# Patient Record
Sex: FEMALE | Employment: FULL TIME | URBAN - METROPOLITAN AREA
[De-identification: names, ages, dates, MRNs, and addresses within clinical notes are randomized per-mention and may not be internally consistent; named-entity substitution may affect disease eponyms.]

---

## 2017-04-24 ENCOUNTER — OFFICE VISIT (OUTPATIENT)
Dept: FAMILY MEDICINE CLINIC | Age: 62
End: 2017-04-24

## 2017-04-24 VITALS
HEART RATE: 65 BPM | TEMPERATURE: 97.8 F | WEIGHT: 141.4 LBS | DIASTOLIC BLOOD PRESSURE: 93 MMHG | HEIGHT: 62 IN | SYSTOLIC BLOOD PRESSURE: 135 MMHG | RESPIRATION RATE: 16 BRPM | OXYGEN SATURATION: 99 % | BODY MASS INDEX: 26.02 KG/M2

## 2017-04-24 DIAGNOSIS — E03.9 HYPOTHYROIDISM, UNSPECIFIED TYPE: ICD-10-CM

## 2017-04-24 DIAGNOSIS — Z11.59 NEED FOR HEPATITIS C SCREENING TEST: ICD-10-CM
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
## 2017-04-24 DIAGNOSIS — E03.9 ACQUIRED HYPOTHYROIDISM: ICD-10-CM

## 2017-04-24 DIAGNOSIS — Z00.00 WELL WOMAN EXAM WITHOUT GYNECOLOGICAL EXAM: Primary | ICD-10-CM

## 2017-04-24 RX ORDER — MUPIROCIN 20 MG/G
OINTMENT TOPICAL DAILY
Qty: 22 G | Refills: 0 | Status: SHIPPED | OUTPATIENT
Start: 2017-04-24 | End: 2019-06-27

## 2017-04-24 RX ORDER — LEVOTHYROXINE SODIUM 88 UG/1
88 TABLET ORAL
Qty: 90 TAB | Refills: 3 | Status: SHIPPED | OUTPATIENT
Start: 2017-04-24 | End: 2018-04-09 | Stop reason: SDUPTHER

## 2017-04-24 NOTE — PROGRESS NOTES
Chief Complaint   Patient presents with    Complete Physical     1. Have you been to the ER, urgent care clinic since your last visit? Hospitalized since your last visit? No    2. Have you seen or consulted any other health care providers outside of the [address] [place] [address] since your last visit? Include any pap smears or colon screening.  No

## 2017-04-24 NOTE — MR AVS SNAPSHOT
Visit Information Date & Time Provider Department Dept. Phone Encounter #  
 4/24/2017  9:00 AM Mouna Horne, [address] [facility] [phone] [id number] Follow-up Instructions Return if symptoms worsen or fail to improve. Upcoming Health Maintenance Date Due Hepatitis C Screening 1955 FOBT Q 1 YEAR AGE 50-75 5/21/2007 BREAST CANCER SCRN MAMMOGRAM 1/30/2019 PAP AKA CERVICAL CYTOLOGY 4/24/2020 DTaP/Tdap/Td series (2 - Td) 12/21/2022 Allergies as of 4/24/2017  Review Complete On: 4/24/2017 By: Mouna Horne MD  
  
 Severity Noted Reaction Type Reactions Amoxicillin Medium 11/16/2016    Other (comments) Augment gave her diarrhea bad Azithromycin  09/20/2010    Itching Fish Derived  09/20/2010    Swelling  
 angioedema Iodine  09/20/2010    Swelling Shellfish Derived  10/13/2010    Other (comments) Causes angioedema Current Immunizations  Reviewed on 10/27/2016 Name Date Hep A Vaccine 2/25/2013, 1/22/2013 Hep B Vaccine 2/25/2013, 1/22/2013 Influenza Vaccine 10/13/2016 Influenza Vaccine Whole 10/31/2012 Tdap 12/21/2012 Not reviewed this visit You Were Diagnosed With   
  
 Codes Comments Well woman exam without gynecological exam    -  Primary ICD-10-CM: Z00.00 ICD-9-CM: V70.0 Hypothyroidism, unspecified type     ICD-10-CM: E03.9 ICD-9-CM: 244.9 Acquired hypothyroidism     ICD-10-CM: E03.9 ICD-9-CM: 244.9 Need for hepatitis C screening test     ICD-10-CM: Z11.59 
ICD-9-CM: V73.89 Vitals BP Pulse Temp Resp Height(growth percentile) Weight(growth percentile) (!) 135/93 65 97.8 °F (36.6 °C) (Oral) 16 5' 2\" (1.575 m) 141 lb 6.4 oz (64.1 kg) LMP SpO2 BMI OB Status Smoking Status 09/20/2007 99% 25.86 kg/m2 Postmenopausal Never Smoker Vitals History BMI and BSA Data  Body Mass Index Body Surface Area  
 25.86 kg/m 2 1.67 m 2  
  
  
 Preferred Pharmacy Pharmacy Name Phone [id number] [facility] [phone] Your Updated Medication List  
  
   
This list is accurate as of: 4/24/17  9:28 AM.  Always use your most recent med list.  
  
  
  
  
 ALEVE 220 mg Cap Generic drug:  naproxen sodium Take  by mouth as needed. ascorbic acid (vitamin C) 500 mg tablet Commonly known as:  VITAMIN C Take  by mouth. B.infantis-B.ani-B.long-B.bifi 10-15 mg Tbec Take  by mouth.  
  
 biotin 2,500 mcg Tab Take  by mouth. cholecalciferol 1,000 unit Cap Commonly known as:  VITAMIN D3 Take  by mouth daily. Epinephrine Base 0.3 mg/0.3 mL Syrg  
0.3 mg by Injection route once as needed for 1 dose. FLAXSEED OIL PO Take  by mouth. hydrocortisone 1 % lotion Commonly known as:  ALA-JUAN RAMON Apply  to affected area two (2) times a day. use thin layer IMODIUM PO Take  by mouth.  
  
 levothyroxine 88 mcg tablet Commonly known as:  SYNTHROID Take 1 Tab by mouth Daily (before breakfast). SUMAtriptan 50 mg tablet Commonly known as:  IMITREX Prescriptions Sent to Pharmacy Refills  
 levothyroxine (SYNTHROID) 88 mcg tablet 3 Sig: Take 1 Tab by mouth Daily (before breakfast). Class: Normal  
 Pharmacy: [address] [place] [address] Ph #: [phone] Route: Oral  
  
We Performed the Following CBC W/O DIFF [23492 CPT(R)] CHOLESTEROL, TOTAL [31900 CPT(R)] HEPATITIS C AB [99707 CPT(R)] LDL, DIRECT [id number] CPT(R)] METABOLIC PANEL, COMPREHENSIVE [99060 CPT(R)] TSH 3RD GENERATION [62824 CPT(R)] Follow-up Instructions Return if symptoms worsen or fail to improve. Patient Instructions Learning About Low-Carbohydrate Diets for Weight Loss What is a low-carbohydrate diet? Low-carb diets avoid foods that are high in carbohydrate. These high-carb foods include pasta, bread, rice, cereal, fruits, and starchy vegetables. Instead, these diets usually have you eat foods that are high in fat and protein. Many people lose weight quickly on a low-carb diet. But the early weight loss is water. People on this diet often gain the weight back after they start eating carbs again. Not all diet plans are safe or work well. A lot of the evidence shows that low-carb diets aren't healthy. That's because these diets often don't include healthy foods like fruits and vegetables. Losing weight safely means balancing protein, fat, and carbs with every meal and snack. And low-carb diets don't always provide the vitamins, minerals, and fiber you need. If you have a serious medical condition, talk to your doctor before you try any diet. These conditions include kidney disease, heart disease, type 2 diabetes, high cholesterol, and high blood pressure. If you are pregnant, it may not be safe for your baby if you are on a low-carb diet. How can you lose weight safely? You might have heard that a diet plan helped another person lose weight. But that doesn't mean that it will work for you. It is very hard to stay on a diet that includes lots of big changes in your eating habits. If you want to get to a healthy weight and stay there, making healthy lifestyle changes will often work better than dieting. These steps can help. · Make a plan for change. Work with your doctor to create a plan that is right for you. · See a dietitian. He or she can show you how to make healthy changes in your eating habits. · Manage stress. If you have a lot of stress in your life, it can be hard to focus on making healthy changes to your daily habits. · Track your food and activity. You are likely to do better at losing weight if you keep track of what you eat and what you do. Follow-up care is a key part of your treatment and safety. Be sure to make and go to all appointments, and call your doctor if you are having problems. It's also a good idea to know your test results and keep a list of the medicines you take. Where can you learn more? Go to http://nisreen-chetna.info/. Enter A121 in the search box to learn more about \"Learning About Low-Carbohydrate Diets for Weight Loss. \" Current as of: December 8, 2016 Content Version: 11.2 © 3553-5101 [employer]. Care instructions adapted under license by [employer] (which disclaims liability or warranty for this information). If you have questions about a medical condition or this instruction, always ask your healthcare professional. [address] any warranty or liability for your use of this information. Learning About Dietary Guidelines What are the Dietary Guidelines for Americans? Dietary Guidelines for Americans provide tips for eating well and staying healthy. This helps reduce the risk for long-term (chronic) diseases. These adult guidelines from the [place] recommend that you: 
· Eat lots of fruits, vegetables, whole grains, and low-fat or nonfat dairy products. · Try to balance your eating with your activity. This helps you stay at a healthy weight. · Drink alcohol in moderation, if at all. · Limit foods high in salt, saturated fat, trans fat, and added sugar. What is MyPlate? MyPlate is the U.[place]. government's food guide. It can help you make your own well-balanced eating plan. A balanced eating plan means that you eat enough, but not too much, and that your food gives you the nutrients you need to stay healthy. MyPlate focuses on eating plenty of whole grains, fruits, and vegetables, and on limiting fat and sugar. It is available online at www. ChooseMyPlate.gov. How can you get started? MyPlate suggests that most adults eat certain amounts from the different food groups: 
Grains Eat 5 to 8 ounces of grains each day. Half of those should be whole grains. Choose whole-grain breads, cold and cooked cereals and grains, pasta (without creamy sauces), hard rolls, or low-fat or fat-free crackers. Vegetables Eat 2 to 3 cups of vegetables every day. They contain little if any fat. And they have lots of nutrients that help protect against heart disease. Fruits Eat 1½ to 2 cups of fruits every day. Fruits contain very little fat but lots of nutrients. Protein foods Most adults need 5 to 6½ ounces each day. Choose fish and lean poultry more often. Eat red meat and fried meats less often. Dried beans, tofu, and nuts are also good sources of protein. Dairy Most adults need 3 cups of milk and milk products a day. Choose low-fat or fat-free products from this food group. If you have problems digesting milk, try eating cheese or yogurt instead. Limit fats and oils, including those used in cooking. When you do use fats, choose oils that are liquid at room temperature (unsaturated fats). These include canola oil and olive oil. Avoid foods with trans fats, such as many fried foods, cookies, and snack foods. Where can you learn more? Go to http://nisreen-chetna.info/. Enter D194 in the search box to learn more about \"Learning About Dietary Guidelines. \" Current as of: July 26, 2016 Content Version: 11.2 © 5899-2004 [employer], [employer]. Care instructions adapted under license by [employer] (which disclaims liability or warranty for this information). If you have questions about a medical condition or this instruction, always ask your healthcare professional. [place] [address] any warranty or liability for your use of this information. Eating Healthy Foods: Care Instructions Your Care Instructions Eating healthy foods can help lower your risk for disease. Healthy food gives you energy and keeps your heart strong, your brain active, your muscles working, and your bones strong. A healthy diet includes a variety of foods from the basic food groups: grains, vegetables, fruits, milk and milk products, and meat and beans. Some people may eat more of their favorite foods from only one food group and, as a result, miss getting the nutrients they need. So, it is important to pay attention not only to what you eat but also to what you are missing from your diet. You can eat a healthy, balanced diet by making a few small changes. Follow-up care is a key part of your treatment and safety. Be sure to make and go to all appointments, and call your doctor if you are having problems. Its also a good idea to know your test results and keep a list of the medicines you take. How can you care for yourself at home? Look at what you eat · Keep a food diary for a week or two and record everything you eat or drink. Track the number of servings you eat from each food group. · For a balanced diet every day, eat a variety of: ¨ 6 or more ounce-equivalents of grains, such as cereals, breads, crackers, rice, or pasta, every day. An ounce-equivalent is 1 slice of bread, 1 cup of ready-to-eat cereal, or ½ cup of cooked rice, cooked pasta, or cooked cereal. 
¨ 2½ cups of vegetables, especially: § Dark-green vegetables such as broccoli and spinach. § Orange vegetables such as carrots and sweet potatoes. § Dry beans (such as guardado and kidney beans) and peas (such as lentils). ¨ 2 cups of fresh, frozen, or canned fruit. A small apple or 1 banana or orange equals 1 cup. ¨ 3 cups of nonfat or low-fat milk, yogurt, or other milk products. ¨ 5½ ounces of meat and beans, such as chicken, fish, lean meat, beans, nuts, and seeds.  One egg, 1 tablespoon of peanut butter, ½ ounce nuts or seeds, or ¼ cup of cooked beans equals 1 ounce of meat. · Learn how to read food labels for serving sizes and ingredients. Fast-food and convenience-food meals often contain few or no fruits or vegetables. Make sure you eat some fruits and vegetables to make the meal more nutritious. · Look at your food diary. For each food group, add up what you have eaten and then divide the total by the number of days. This will give you an idea of how much you are eating from each food group. See if you can find some ways to change your diet to make it more healthy. Start small · Do not try to make dramatic changes to your diet all at once. You might feel that you are missing out on your favorite foods and then be more likely to fail. · Start slowly, and gradually change your habits. Try some of the following: ¨ Use whole wheat bread instead of white bread. ¨ Use nonfat or low-fat milk instead of whole milk. ¨ Eat brown rice instead of white rice, and eat whole wheat pasta instead of white-flour pasta. ¨ Try low-fat cheeses and low-fat yogurt. ¨ Add more fruits and vegetables to meals and have them for snacks. ¨ Add lettuce, tomato, cucumber, and onion to sandwiches. ¨ Add fruit to yogurt and cereal. 
Enjoy food · You can still eat your favorite foods. You just may need to eat less of them. If your favorite foods are high in fat, salt, and sugar, limit how often you eat them, but do not cut them out entirely. · Eat a wide variety of foods. Make healthy choices when eating out · The type of restaurant you choose can help you make healthy choices. Even fast-food chains are now offering more low-fat or healthier choices on the menu. · Choose smaller portions, or take half of your meal home. · When eating out, try: ¨ A veggie pizza with a whole wheat crust or grilled chicken (instead of sausage or pepperoni). ¨ Pasta with roasted vegetables, grilled chicken, or marinara sauce instead of cream sauce. ¨ A vegetable wrap or grilled chicken wrap. ¨ Broiled or poached food instead of fried or breaded items. Make healthy choices easy · Buy packaged, prewashed, ready-to-eat fresh vegetables and fruits, such as baby carrots, salad mixes, and chopped or shredded broccoli and cauliflower. · Buy packaged, presliced fruits, such as melon or pineapple. · Choose 100% fruit or vegetable juice instead of soda. Limit juice intake to 4 to 6 oz (½ to ¾ cup) a day. · Blend low-fat yogurt, fruit juice, and canned or frozen fruit to make a smoothie for breakfast or a snack. Where can you learn more? Go to http://nisreen-chetna.info/. Enter T756 in the search box to learn more about \"Eating Healthy Foods: Care Instructions. \" Current as of: November 20, 2015 Content Version: 11.2 © 2867-3491 [employer]. Care instructions adapted under license by [employer] (which disclaims liability or warranty for this information). If you have questions about a medical condition or this instruction, always ask your healthcare professional. [place] [address] any warranty or liability for your use of this information. Hepatitis C Virus Tests: About These Tests What are they? Hepatitis C virus tests are blood tests that check for substances in the blood that show whether you have hepatitis C now or had it in the past. The tests can also tell you what type of hepatitis C you have and how severe the disease is. Why are these tests done? You may need these tests if: 
· You have symptoms of hepatitis. · You may have been exposed to the virus. You are more likely to have been exposed to the virus if you inject drugs or are exposed to body fluids (such as if you are a health care worker). · You have had other tests that show you have liver problems. · You were born between [facility]. People in this age group are more likely to have hepatitis C and not know it. · You have HIV infection. The tests also are done to help your doctor decide about your treatment and see how well it works. How can you prepare for these tests? You do not need to do anything before you have these tests. What happens during these tests? A health professional takes a sample of your blood. What else should you know about these tests? · The tests can tell your doctor what type of hepatitis C you have and how severe it is. This can help your doctor determine treatment and see how effective it is. · If you have the tests soon after you were infected with hepatitis C, they may show that you do not have the disease even when you have it. This is because the substances that show you have hepatitis C can take weeks or months to develop, so they may not be in your blood yet. Your doctor may want you to be tested again. · If the tests show you have long-term hepatitis C, you need to take steps to prevent spreading the disease. What happens after these tests? · You will probably be able to go home right away. · You can go back to your usual activities right away. When should you call for help? Watch closely for changes in your health, and be sure to contact your doctor if you have any problems. Follow-up care is a key part of your treatment and safety. Be sure to make and go to all appointments, and call your doctor if you are having problems. It's also a good idea to keep a list of the medicines you take. Ask your doctor when you can expect to have your test results. Where can you learn more? Go to http://nisreen-chetna.info/. Enter A212 in the search box to learn more about \"Hepatitis C Virus Tests: About These Tests. \" Current as of: May 24, 2016 Content Version: 11.2 © 2488-3305 [employer].  Care instructions adapted under license by [employer] (which disclaims liability or warranty for this information). If you have questions about a medical condition or this instruction, always ask your healthcare professional. [place] [address] any warranty or liability for your use of this information. Well Visit, Women 48 to 72: Care Instructions Your Care Instructions Physical exams can help you stay healthy. Your doctor has checked your overall health and may have suggested ways to take good care of yourself. He or she also may have recommended tests. At home, you can help prevent illness with healthy eating, regular exercise, and other steps. Follow-up care is a key part of your treatment and safety. Be sure to make and go to all appointments, and call your doctor if you are having problems. It's also a good idea to know your test results and keep a list of the medicines you take. How can you care for yourself at home? · Reach and stay at a healthy weight. This will lower your risk for many problems, such as obesity, diabetes, heart disease, and high blood pressure. · Get at least 30 minutes of exercise on most days of the week. Walking is a good choice. You also may want to do other activities, such as running, swimming, cycling, or playing tennis or team sports. · Do not smoke. Smoking can make health problems worse. If you need help quitting, talk to your doctor about stop-smoking programs and medicines. These can increase your chances of quitting for good. · Protect your skin from too much sun. When you're outdoors from 10 a.m. to 4 p.m., stay in the shade or cover up with clothing and a hat with a wide brim. Wear sunglasses that block UV rays. Even when it's cloudy, put broad-spectrum sunscreen (SPF 30 or higher) on any exposed skin. · See a dentist one or two times a year for checkups and to have your teeth cleaned. · Wear a seat belt in the car. · Limit alcohol to 1 drink a day. Too much alcohol can cause health problems. Follow your doctor's advice about when to have certain tests. These tests can spot problems early. · Cholesterol. Your doctor will tell you how often to have this done based on your age, family history, or other things that can increase your risk for heart attack and stroke. · Blood pressure. Have your blood pressure checked during a routine doctor visit. Your doctor will tell you how often to check your blood pressure based on your age, your blood pressure results, and other factors. · Mammogram. Ask your doctor how often you should have a mammogram, which is an X-ray of your breasts. A mammogram can spot breast cancer before it can be felt and when it is easiest to treat. · Pap test and pelvic exam. Ask your doctor how often you should have a Pap test. You may not need to have a Pap test as often as you used to. · Vision. Have your eyes checked every year or two or as often as your doctor suggests. Some experts recommend that you have yearly exams for glaucoma and other age-related eye problems starting at age 48. · Hearing. Tell your doctor if you notice any change in your hearing. You can have tests to find out how well you hear. · Diabetes. Ask your doctor whether you should have tests for diabetes. · Colon cancer. You should begin tests for colon cancer at age 48. You may have one of several tests. Your doctor will tell you how often to have tests based on your age and risk. Risks include whether you already had a precancerous polyp removed from your colon or whether your parents, sisters and brothers, or children have had colon cancer. · Thyroid disease. Talk to your doctor about whether to have your thyroid checked as part of a regular physical exam. Women have an increased chance of a thyroid problem. · Osteoporosis. You should begin tests for bone density at age 72.  If you are younger than 72, ask your doctor whether you have factors that may increase your risk for this disease. You may want to have this test before age 72. · Heart attack and stroke risk. At least every 4 to 6 years, you should have your risk for heart attack and stroke assessed. Your doctor uses factors such as your age, blood pressure, cholesterol, and whether you smoke or have diabetes to show what your risk for a heart attack or stroke is over the next 10 years. When should you call for help? Watch closely for changes in your health, and be sure to contact your doctor if you have any problems or symptoms that concern you. Where can you learn more? Go to http://nisreen-chetna.info/. Enter Y563 in the search box to learn more about \"Well Visit, Women 50 to 72: Care Instructions. \" Current as of: July 19, 2016 Content Version: 11.2 © 4476-7626 [employer]. Care instructions adapted under license by my6sense (which disclaims liability or warranty for this information). If you have questions about a medical condition or this instruction, always ask your healthcare professional. [place] [address] any warranty or liability for your use of this information. Introducing [facility]! Dear Radha Aguilar: Thank you for requesting a All Web Leads account. Our records indicate that you already have an active All Web Leads account. You can access your account anytime at [email or web address] Did you know that you can access your hospital and ER discharge instructions at any time in [employer]s? You can also review all of your test results from your hospital stay or ER visit. Additional Information If you have questions, please visit the Frequently Asked Questions section of the All Web Leads website at [email or web address] Remember, All Web Leads is NOT to be used for urgent needs. For medical emergencies, dial 911. Now available from your iPhone and Android! Please provide this summary of care documentation to your next provider. Your primary care clinician is listed as Alen Boogie. If you have any questions after today's visit, please call [phone].

## 2017-04-24 NOTE — PROGRESS NOTES
Drea Kraft  63 y.o. female  1955  [address]  [id number]   [address]: Progress Note  Doris Arellano MD       Encounter Date: 4/24/2017    Chief Complaint   Patient presents with    Complete Physical     History of Present Illness   Drea Kraft is a 63 y.o. female who presents to clinic today for well visit. Only PMH is hypothyroidism. Doing well. Denies any dry hair, dry skin, constipation, weight gain. She exercises regularly  She watches her diet. She was attempting to lose weight as well. Denies any acute complaints. Pap UTD  Mammogram UTD  Colonoscopy done and not due for another 2 yrs. Currently menopausal for 10 yrs. No vaginal bleeding or spotting. Review of Systems   Review of Systems   Constitutional: Negative. All other systems reviewed and are negative. Vitals/Objective:     Vitals:    04/24/17 0904   BP: (!) 135/93   Pulse: 65   Resp: 16   Temp: 97.8 °F (36.6 °C)   TempSrc: Oral   SpO2: 99%   Weight: 141 lb 6.4 oz (64.1 kg)   Height: 5' 2\" (1.575 m)     Body mass index is 25.86 kg/(m^2). Physical Exam   Constitutional: She appears well-developed and well-nourished. No distress. HENT:   Head: Normocephalic and atraumatic. Right Ear: External ear normal.   Left Ear: External ear normal.   Mouth/Throat: Oropharynx is clear and moist.   Eyes: Pupils are equal, round, and reactive to light. Neck: Normal range of motion. Neck supple. No thyromegaly present. Cardiovascular: Normal rate, regular rhythm and normal heart sounds. Exam reveals no gallop and no friction rub. No murmur heard. Pulmonary/Chest: Effort normal and breath sounds normal. No respiratory distress. She has no wheezes. She has no rales. Abdominal: Soft. Bowel sounds are normal. There is no tenderness. Lymphadenopathy:     She has no cervical adenopathy. Skin: Rash noted. She is not diaphoretic.    Small erythematous mm size bite with some superficial scaling present. Non tender without active discharge   Vitals reviewed. Lab Results   Component Value Date/Time    TSH 3.410 06/13/2016 02:05 PM    TSH 2.560 01/15/2016 04:45 PM         No results found for this or any previous visit (from the past 24 hour(s)). Assessment and Plan:   1. Well woman exam without gynecological exam  Non fasting labs. - CBC W/O DIFF  - TSH 3RD GENERATION  - METABOLIC PANEL, COMPREHENSIVE  - CHOLESTEROL, TOTAL  - LDL, DIRECT  - HEPATITIS C AB    2. Hypothyroidism, unspecified type    - TSH 3RD GENERATION    3. Acquired hypothyroidism    - TSH 3RD GENERATION  - levothyroxine (SYNTHROID) 88 mcg tablet; Take 1 Tab by mouth Daily (before breakfast). Dispense: 90 Tab; Refill: 3    4. Need for hepatitis C screening test    - HEPATITIS C AB    5. Bug bite with infection, initial encounter    - mupirocin (BACTROBAN) 2 % ointment; Apply  to affected area daily. Dispense: 22 g; Refill: 0    I have discussed the diagnosis with the patient and the intended plan as seen in the above orders. she has expressed understanding. The patient has received an after-visit summary and questions were answered concerning future plans. I have discussed medication side effects and warnings with the patient as well. Follow-up Disposition:  Return if symptoms worsen or fail to improve. Electronically Signed: Otho Meigs, MD     History   Patients past medical, surgical and family histories were reviewed and updated. Past Medical History:   Diagnosis Date    Thyroid disease     hypothyroid     Past Surgical History:   Procedure Laterality Date    HX APPENDECTOMY      HX BUNIONECTOMY      HX GYN      BTL    NE EMBOLIZATION UTERINE FIBROID       History reviewed. No pertinent family history.   Social History     Social History    Marital status:      Spouse name: N/A    Number of children: N/A    Years of education: N/A     Occupational History  Not on file. Social History Main Topics    Smoking status: Never Smoker    Smokeless tobacco: Never Used    Alcohol use No    Drug use: No    Sexual activity: Yes     Partners: Male     Birth control/ protection: None     Other Topics Concern    Not on file     Social History Narrative            Current Medications/Allergies     Current Outpatient Prescriptions   Medication Sig Dispense Refill    levothyroxine (SYNTHROID) 88 mcg tablet Take 1 Tab by mouth Daily (before breakfast). 90 Tab 3    mupirocin (BACTROBAN) 2 % ointment Apply  to affected area daily. 22 g 0    LOPERAMIDE HCL (IMODIUM PO) Take  by mouth.  ascorbic acid (VITAMIN C) 500 mg tablet Take  by mouth.  cholecalciferol (VITAMIN D3) 1,000 unit cap Take  by mouth daily.  biotin 2,500 mcg tab Take  by mouth.  B.infantis-B.ani-B.long-B.bifi 10-15 mg TbEC Take  by mouth.  hydrocortisone (ALA-JUAN RAMON) 1 % lotion Apply  to affected area two (2) times a day. use thin layer 60 mL 0    SUMAtriptan (IMITREX) 50 mg tablet   6    FLAXSEED OIL PO Take  by mouth.  naproxen sodium (ALEVE) 220 mg cap Take  by mouth as needed.  Epinephrine Base 0.3 mg/0.3 mL (1:1,000) Syrg 0.3 mg by Injection route once as needed for 1 dose.  1 Syringe 1     Allergies   Allergen Reactions    Amoxicillin Other (comments)     Augment gave her diarrhea bad    Azithromycin Itching    Fish Derived Swelling     angioedema    Iodine Swelling    Shellfish Derived Other (comments)     Causes angioedema

## 2017-04-24 NOTE — PATIENT INSTRUCTIONS
Learning About Low-Carbohydrate Diets for Weight Loss  What is a low-carbohydrate diet? Low-carb diets avoid foods that are high in carbohydrate. These high-carb foods include pasta, bread, rice, cereal, fruits, and starchy vegetables. Instead, these diets usually have you eat foods that are high in fat and protein. Many people lose weight quickly on a low-carb diet. But the early weight loss is water. People on this diet often gain the weight back after they start eating carbs again. Not all diet plans are safe or work well. A lot of the evidence shows that low-carb diets aren't healthy. That's because these diets often don't include healthy foods like fruits and vegetables. Losing weight safely means balancing protein, fat, and carbs with every meal and snack. And low-carb diets don't always provide the vitamins, minerals, and fiber you need. If you have a serious medical condition, talk to your doctor before you try any diet. These conditions include kidney disease, heart disease, type 2 diabetes, high cholesterol, and high blood pressure. If you are pregnant, it may not be safe for your baby if you are on a low-carb diet. How can you lose weight safely? You might have heard that a diet plan helped another person lose weight. But that doesn't mean that it will work for you. It is very hard to stay on a diet that includes lots of big changes in your eating habits. If you want to get to a healthy weight and stay there, making healthy lifestyle changes will often work better than dieting. These steps can help. · Make a plan for change. Work with your doctor to create a plan that is right for you. · See a dietitian. He or she can show you how to make healthy changes in your eating habits. · Manage stress. If you have a lot of stress in your life, it can be hard to focus on making healthy changes to your daily habits. · Track your food and activity.  You are likely to do better at losing weight if you keep track of what you eat and what you do. Follow-up care is a key part of your treatment and safety. Be sure to make and go to all appointments, and call your doctor if you are having problems. It's also a good idea to know your test results and keep a list of the medicines you take. Where can you learn more? Go to http://nisreen-chetna.info/. Enter A121 in the search box to learn more about \"Learning About Low-Carbohydrate Diets for Weight Loss. \"  Current as of: December 8, 2016  Content Version: 11.2  © 5354-3272 [employer]. Care instructions adapted under license by [employer] (which disclaims liability or warranty for this information). If you have questions about a medical condition or this instruction, always ask your healthcare professional. [address] any warranty or liability for your use of this information. Learning About Dietary Guidelines  What are the Dietary Guidelines for Americans? Dietary Guidelines for Americans provide tips for eating well and staying healthy. This helps reduce the risk for long-term (chronic) diseases. These adult guidelines from the [place] recommend that you:  · Eat lots of fruits, vegetables, whole grains, and low-fat or nonfat dairy products. · Try to balance your eating with your activity. This helps you stay at a healthy weight. · Drink alcohol in moderation, if at all. · Limit foods high in salt, saturated fat, trans fat, and added sugar. What is MyPlate? MyPlate is the [place] government's food guide. It can help you make your own well-balanced eating plan. A balanced eating plan means that you eat enough, but not too much, and that your food gives you the nutrients you need to stay healthy. MyPlate focuses on eating plenty of whole grains, fruits, and vegetables, and on limiting fat and sugar. It is available online at www. ChooseMyPlate.gov.   How can you get started? MyPlate suggests that most adults eat certain amounts from the different food groups:  Grains  Eat 5 to 8 ounces of grains each day. Half of those should be whole grains. Choose whole-grain breads, cold and cooked cereals and grains, pasta (without creamy sauces), hard rolls, or low-fat or fat-free crackers. Vegetables  Eat 2 to 3 cups of vegetables every day. They contain little if any fat. And they have lots of nutrients that help protect against heart disease. Fruits  Eat 1½ to 2 cups of fruits every day. Fruits contain very little fat but lots of nutrients. Protein foods  Most adults need 5 to 6½ ounces each day. Choose fish and lean poultry more often. Eat red meat and fried meats less often. Dried beans, tofu, and nuts are also good sources of protein. Dairy  Most adults need 3 cups of milk and milk products a day. Choose low-fat or fat-free products from this food group. If you have problems digesting milk, try eating cheese or yogurt instead. Limit fats and oils, including those used in cooking. When you do use fats, choose oils that are liquid at room temperature (unsaturated fats). These include canola oil and olive oil. Avoid foods with trans fats, such as many fried foods, cookies, and snack foods. Where can you learn more? Go to http://nisreen-chetna.info/. Enter J143 in the search box to learn more about \"Learning About Dietary Guidelines. \"  Current as of: July 26, 2016  Content Version: 11.2  © 6129-0550 [employer]wolf. Care instructions adapted under license by [employer] (which disclaims liability or warranty for this information). If you have questions about a medical condition or this instruction, always ask your healthcare professional. [place] [address] any warranty or liability for your use of this information.        Eating Healthy Foods: Care Instructions  Your Care Instructions  Eating healthy foods can help lower your risk for disease. Healthy food gives you energy and keeps your heart strong, your brain active, your muscles working, and your bones strong. A healthy diet includes a variety of foods from the basic food groups: grains, vegetables, fruits, milk and milk products, and meat and beans. Some people may eat more of their favorite foods from only one food group and, as a result, miss getting the nutrients they need. So, it is important to pay attention not only to what you eat but also to what you are missing from your diet. You can eat a healthy, balanced diet by making a few small changes. Follow-up care is a key part of your treatment and safety. Be sure to make and go to all appointments, and call your doctor if you are having problems. Its also a good idea to know your test results and keep a list of the medicines you take. How can you care for yourself at home? Look at what you eat  · Keep a food diary for a week or two and record everything you eat or drink. Track the number of servings you eat from each food group. · For a balanced diet every day, eat a variety of:  ¨ 6 or more ounce-equivalents of grains, such as cereals, breads, crackers, rice, or pasta, every day. An ounce-equivalent is 1 slice of bread, 1 cup of ready-to-eat cereal, or ½ cup of cooked rice, cooked pasta, or cooked cereal.  ¨ 2½ cups of vegetables, especially:  § Dark-green vegetables such as broccoli and spinach. § Orange vegetables such as carrots and sweet potatoes. § Dry beans (such as guardado and kidney beans) and peas (such as lentils). ¨ 2 cups of fresh, frozen, or canned fruit. A small apple or 1 banana or orange equals 1 cup. ¨ 3 cups of nonfat or low-fat milk, yogurt, or other milk products. ¨ 5½ ounces of meat and beans, such as chicken, fish, lean meat, beans, nuts, and seeds. One egg, 1 tablespoon of peanut butter, ½ ounce nuts or seeds, or ¼ cup of cooked beans equals 1 ounce of meat.   · Learn how to read food labels for serving sizes and ingredients. Fast-food and convenience-food meals often contain few or no fruits or vegetables. Make sure you eat some fruits and vegetables to make the meal more nutritious. · Look at your food diary. For each food group, add up what you have eaten and then divide the total by the number of days. This will give you an idea of how much you are eating from each food group. See if you can find some ways to change your diet to make it more healthy. Start small  · Do not try to make dramatic changes to your diet all at once. You might feel that you are missing out on your favorite foods and then be more likely to fail. · Start slowly, and gradually change your habits. Try some of the following:  ¨ Use whole wheat bread instead of white bread. ¨ Use nonfat or low-fat milk instead of whole milk. ¨ Eat brown rice instead of white rice, and eat whole wheat pasta instead of white-flour pasta. ¨ Try low-fat cheeses and low-fat yogurt. ¨ Add more fruits and vegetables to meals and have them for snacks. ¨ Add lettuce, tomato, cucumber, and onion to sandwiches. ¨ Add fruit to yogurt and cereal.  Enjoy food  · You can still eat your favorite foods. You just may need to eat less of them. If your favorite foods are high in fat, salt, and sugar, limit how often you eat them, but do not cut them out entirely. · Eat a wide variety of foods. Make healthy choices when eating out  · The type of restaurant you choose can help you make healthy choices. Even fast-food chains are now offering more low-fat or healthier choices on the menu. · Choose smaller portions, or take half of your meal home. · When eating out, try:  ¨ A veggie pizza with a whole wheat crust or grilled chicken (instead of sausage or pepperoni). ¨ Pasta with roasted vegetables, grilled chicken, or marinara sauce instead of cream sauce. ¨ A vegetable wrap or grilled chicken wrap.   ¨ Broiled or poached food instead of fried or breaded items. Make healthy choices easy  · Buy packaged, prewashed, ready-to-eat fresh vegetables and fruits, such as baby carrots, salad mixes, and chopped or shredded broccoli and cauliflower. · Buy packaged, presliced fruits, such as melon or pineapple. · Choose 100% fruit or vegetable juice instead of soda. Limit juice intake to 4 to 6 oz (½ to ¾ cup) a day. · Blend low-fat yogurt, fruit juice, and canned or frozen fruit to make a smoothie for breakfast or a snack. Where can you learn more? Go to http://nisreen-chetna.info/. Enter T756 in the search box to learn more about \"Eating Healthy Foods: Care Instructions. \"  Current as of: November 20, 2015  Content Version: 11.2  © 7479-1197 [employer]. Care instructions adapted under license by [employer] (which disclaims liability or warranty for this information). If you have questions about a medical condition or this instruction, always ask your healthcare professional. [address] any warranty or liability for your use of this information. Hepatitis C Virus Tests: About These Tests  What are they? Hepatitis C virus tests are blood tests that check for substances in the blood that show whether you have hepatitis C now or had it in the past. The tests can also tell you what type of hepatitis C you have and how severe the disease is. Why are these tests done? You may need these tests if:  · You have symptoms of hepatitis. · You may have been exposed to the virus. You are more likely to have been exposed to the virus if you inject drugs or are exposed to body fluids (such as if you are a health care worker). · You have had other tests that show you have liver problems. · You were born between [facility]. People in this age group are more likely to have hepatitis C and not know it. · You have HIV infection.   The tests also are done to help your doctor decide about your treatment and see how well it works. How can you prepare for these tests? You do not need to do anything before you have these tests. What happens during these tests? A health professional takes a sample of your blood. What else should you know about these tests? · The tests can tell your doctor what type of hepatitis C you have and how severe it is. This can help your doctor determine treatment and see how effective it is. · If you have the tests soon after you were infected with hepatitis C, they may show that you do not have the disease even when you have it. This is because the substances that show you have hepatitis C can take weeks or months to develop, so they may not be in your blood yet. Your doctor may want you to be tested again. · If the tests show you have long-term hepatitis C, you need to take steps to prevent spreading the disease. What happens after these tests? · You will probably be able to go home right away. · You can go back to your usual activities right away. When should you call for help? Watch closely for changes in your health, and be sure to contact your doctor if you have any problems. Follow-up care is a key part of your treatment and safety. Be sure to make and go to all appointments, and call your doctor if you are having problems. It's also a good idea to keep a list of the medicines you take. Ask your doctor when you can expect to have your test results. Where can you learn more? Go to http://nisreen-chetna.info/. Enter Q889 in the search box to learn more about \"Hepatitis C Virus Tests: About These Tests. \"  Current as of: May 24, 2016  Content Version: 11.2  © 5621-4932 Healthwise, [employer]orporated. Care instructions adapted under license by [employer] (which disclaims liability or warranty for this information).  If you have questions about a medical condition or this instruction, always ask your healthcare professional. [address]  any warranty or liability for your use of this information. Well Visit, Women 48 to 72: Care Instructions  Your Care Instructions  Physical exams can help you stay healthy. Your doctor has checked your overall health and may have suggested ways to take good care of yourself. He or she also may have recommended tests. At home, you can help prevent illness with healthy eating, regular exercise, and other steps. Follow-up care is a key part of your treatment and safety. Be sure to make and go to all appointments, and call your doctor if you are having problems. It's also a good idea to know your test results and keep a list of the medicines you take. How can you care for yourself at home? · Reach and stay at a healthy weight. This will lower your risk for many problems, such as obesity, diabetes, heart disease, and high blood pressure. · Get at least 30 minutes of exercise on most days of the week. Walking is a good choice. You also may want to do other activities, such as running, swimming, cycling, or playing tennis or team sports. · Do not smoke. Smoking can make health problems worse. If you need help quitting, talk to your doctor about stop-smoking programs and medicines. These can increase your chances of quitting for good. · Protect your skin from too much sun. When you're outdoors from 10 a.m. to 4 p.m., stay in the shade or cover up with clothing and a hat with a wide brim. Wear sunglasses that block UV rays. Even when it's cloudy, put broad-spectrum sunscreen (SPF 30 or higher) on any exposed skin. · See a dentist one or two times a year for checkups and to have your teeth cleaned. · Wear a seat belt in the car. · Limit alcohol to 1 drink a day. Too much alcohol can cause health problems. Follow your doctor's advice about when to have certain tests. These tests can spot problems early. · Cholesterol.  Your doctor will tell you how often to have this done based on your age, family history, or other things that can increase your risk for heart attack and stroke. · Blood pressure. Have your blood pressure checked during a routine doctor visit. Your doctor will tell you how often to check your blood pressure based on your age, your blood pressure results, and other factors. · Mammogram. Ask your doctor how often you should have a mammogram, which is an X-ray of your breasts. A mammogram can spot breast cancer before it can be felt and when it is easiest to treat. · Pap test and pelvic exam. Ask your doctor how often you should have a Pap test. You may not need to have a Pap test as often as you used to. · Vision. Have your eyes checked every year or two or as often as your doctor suggests. Some experts recommend that you have yearly exams for glaucoma and other age-related eye problems starting at age 48. · Hearing. Tell your doctor if you notice any change in your hearing. You can have tests to find out how well you hear. · Diabetes. Ask your doctor whether you should have tests for diabetes. · Colon cancer. You should begin tests for colon cancer at age 48. You may have one of several tests. Your doctor will tell you how often to have tests based on your age and risk. Risks include whether you already had a precancerous polyp removed from your colon or whether your parents, sisters and brothers, or children have had colon cancer. · Thyroid disease. Talk to your doctor about whether to have your thyroid checked as part of a regular physical exam. Women have an increased chance of a thyroid problem. · Osteoporosis. You should begin tests for bone density at age 72. If you are younger than 72, ask your doctor whether you have factors that may increase your risk for this disease. You may want to have this test before age 72. · Heart attack and stroke risk. At least every 4 to 6 years, you should have your risk for heart attack and stroke assessed.  Your doctor uses factors such as your age, blood pressure, cholesterol, and whether you smoke or have diabetes to show what your risk for a heart attack or stroke is over the next 10 years. When should you call for help? Watch closely for changes in your health, and be sure to contact your doctor if you have any problems or symptoms that concern you. Where can you learn more? Go to http://nisreen-chetna.info/. Enter N109 in the search box to learn more about \"Well Visit, Women 50 to 72: Care Instructions. \"  Current as of: July 19, 2016  Content Version: 11.2  © 2221-0076 [employer]. Care instructions adapted under license by [employer] (which disclaims liability or warranty for this information). If you have questions about a medical condition or this instruction, always ask your healthcare professional. [address] any warranty or liability for your use of this information.

## 2017-04-25 DIAGNOSIS — E78.00 ELEVATED CHOLESTEROL: Primary | ICD-10-CM

## 2017-04-25 LAB
ALBUMIN SERPL-MCNC: 4.2 G/DL (ref 3.5–5.5)
ALBUMIN/GLOB SERPL: 1.7 {RATIO} (ref 1.2–2.2)
ALP SERPL-CCNC: 65 IU/L (ref 39–117)
ALT SERPL-CCNC: 21 IU/L (ref 0–32)
AST SERPL-CCNC: 17 IU/L (ref 0–40)
BILIRUB SERPL-MCNC: 0.6 MG/DL (ref 0–1.2)
BUN SERPL-MCNC: 14 MG/DL (ref 6–24)
BUN/CREAT SERPL: 19 (ref 9–23)
CALCIUM SERPL-MCNC: 9.4 MG/DL (ref 8.7–10.2)
CHLORIDE SERPL-SCNC: 98 MMOL/L (ref 96–106)
CHOLEST SERPL-MCNC: 211 MG/DL (ref 100–199)
CO2 SERPL-SCNC: 23 MMOL/L (ref 18–29)
CREAT SERPL-MCNC: 0.74 MG/DL (ref 0.57–1)
ERYTHROCYTE [DISTWIDTH] IN BLOOD BY AUTOMATED COUNT: 14.1 % (ref 12.3–15.4)
GLOBULIN SER CALC-MCNC: 2.5 G/DL (ref 1.5–4.5)
GLUCOSE SERPL-MCNC: 97 MG/DL (ref 65–99)
HCT VFR BLD AUTO: 42.4 % (ref 34–46.6)
HCV AB S/CO SERPL IA: <0.1 S/CO RATIO (ref 0–0.9)
HGB BLD-MCNC: 14.1 G/DL (ref 11.1–15.9)
LDLC SERPL DIRECT ASSAY-MCNC: 137 MG/DL (ref 0–99)
MCH RBC QN AUTO: 31.4 PG (ref 26.6–33)
MCHC RBC AUTO-ENTMCNC: 33.3 G/DL (ref 31.5–35.7)
MCV RBC AUTO: 94 FL (ref 79–97)
PLATELET # BLD AUTO: 248 X10E3/UL (ref 150–379)
POTASSIUM SERPL-SCNC: 4 MMOL/L (ref 3.5–5.2)
PROT SERPL-MCNC: 6.7 G/DL (ref 6–8.5)
RBC # BLD AUTO: 4.49 X10E6/UL (ref 3.77–5.28)
SODIUM SERPL-SCNC: 139 MMOL/L (ref 134–144)
TSH SERPL DL<=0.005 MIU/L-ACNC: 2.27 UIU/ML (ref 0.45–4.5)
WBC # BLD AUTO: 6.6 X10E3/UL (ref 3.4–10.8)

## 2017-04-25 NOTE — PROGRESS NOTES
Call:  All of your labs were normal except your non fasting cholesterol. Please make an appt for fasting cholesterol levels.

## 2017-04-27 ENCOUNTER — TELEPHONE (OUTPATIENT)
Dept: FAMILY MEDICINE CLINIC | Age: 62
End: 2017-04-27

## 2017-04-27 NOTE — TELEPHONE ENCOUNTER
Attempted to call pt regarding lab results per Dr. Santos Hall. Left voicemail for pt to return call back at earliest convenience.

## 2017-04-27 NOTE — TELEPHONE ENCOUNTER
Spoke with pt regarding lab results per doctor. Notified pt that all labs are normal except non fasting cholesterol. Notified pt that doctor would like pt to schedule a lab only appt to have fasting cholesterol done. Pt verbalized understanding and was scheduled for 4/28/17 at 8:00am for lab work.

## 2017-04-28 ENCOUNTER — LAB ONLY (OUTPATIENT)
Dept: FAMILY MEDICINE CLINIC | Age: 62
End: 2017-04-28

## 2017-04-28 DIAGNOSIS — E78.00 ELEVATED CHOLESTEROL: ICD-10-CM

## 2017-04-28 NOTE — MR AVS SNAPSHOT
Visit Information Date & Time Provider Department Dept. Phone Encounter #  
 4/28/2017  8:00 AM LAB [facility] [phone] [id number] Upcoming Health Maintenance Date Due FOBT Q 1 YEAR AGE 50-75 5/21/2007 BREAST CANCER SCRN MAMMOGRAM 1/30/2019 PAP AKA CERVICAL CYTOLOGY 4/24/2020 DTaP/Tdap/Td series (2 - Td) 12/21/2022 Allergies as of 4/28/2017  Review Complete On: 4/24/2017 By: Jaci George MD  
  
 Severity Noted Reaction Type Reactions Amoxicillin Medium 11/16/2016    Other (comments) Augment gave her diarrhea bad Azithromycin  09/20/2010    Itching Fish Derived  09/20/2010    Swelling  
 angioedema Iodine  09/20/2010    Swelling Shellfish Derived  10/13/2010    Other (comments) Causes angioedema Current Immunizations  Reviewed on 10/27/2016 Name Date Hep A Vaccine 2/25/2013, 1/22/2013 Hep B Vaccine 2/25/2013, 1/22/2013 Influenza Vaccine 10/13/2016 Influenza Vaccine Whole 10/31/2012 Tdap 12/21/2012 Not reviewed this visit You Were Diagnosed With   
  
 Codes Comments Elevated cholesterol     ICD-10-CM: E78.00 ICD-9-CM: 272.0 Vitals LMP OB Status Smoking Status 09/20/2007 Postmenopausal Never Smoker Preferred Pharmacy Pharmacy Name Phone [id number] [facility] [phone] Your Updated Medication List  
  
   
This list is accurate as of: 4/28/17 12:02 PM.  Always use your most recent med list.  
  
  
  
  
 ALEVE 220 mg Cap Generic drug:  naproxen sodium Take  by mouth as needed. ascorbic acid (vitamin C) 500 mg tablet Commonly known as:  VITAMIN C Take  by mouth. B.infantis-B.ani-B.long-B.bifi 10-15 mg Tbec Take  by mouth.  
  
 biotin 2,500 mcg Tab Take  by mouth. cholecalciferol 1,000 unit Cap Commonly known as:  VITAMIN D3 Take  by mouth daily. Epinephrine Base 0.3 mg/0.3 mL Syrg  
0.3 mg by Injection route once as needed for 1 dose. FLAXSEED OIL PO Take  by mouth. hydrocortisone 1 % lotion Commonly known as:  ALA-JUAN RAMON Apply  to affected area two (2) times a day. use thin layer IMODIUM PO Take  by mouth.  
  
 levothyroxine 88 mcg tablet Commonly known as:  SYNTHROID Take 1 Tab by mouth Daily (before breakfast). mupirocin 2 % ointment Commonly known as:  [employer] Apply  to affected area daily. SUMAtriptan 50 mg tablet Commonly known as:  IMITREX We Performed the Following LIPID PANEL [43929 CPT(R)] Introducing [facility] SERVICES! Dear Michelle Judge: Thank you for requesting a [employer] account. Our records indicate that you already have an active [employer] account. You can access your account anytime at [email or web address] Did you know that you can access your hospital and ER discharge instructions at any time in [employer]? You can also review all of your test results from your hospital stay or ER visit. Additional Information If you have questions, please visit the Frequently Asked Questions section of the [employer] website at [email or web address] Remember, [employer] is NOT to be used for urgent needs. For medical emergencies, dial 911. Now available from your iPhone and Android! Please provide this summary of care documentation to your next provider. Your primary care clinician is listed as Madhavi Rodriguez. If you have any questions after today's visit, please call [phone].

## 2017-04-28 NOTE — LETTER
5/1/2017 8:21 AM 
 
Ms. Jesus Hahn [address] [phone] Dear Jesus Hahn: Please find your most recent results below. Resulted Orders LIPID PANEL Result Value Ref Range Cholesterol, total 200 (H) 100 - 199 mg/dL Triglyceride 121 0 - 149 mg/dL HDL Cholesterol 57 >39 mg/dL VLDL, calculated 24 5 - 40 mg/dL LDL, calculated 119 (H) 0 - 99 mg/dL Narrative Performed at:  [address]  [id number] : Kim Rodriguez MD, Phone:  [phone] CVD REPORT Result Value Ref Range INTERPRETATION Note Comment:  
   Supplement report is available. Narrative Performed at:  [address]  [id number] : Jesusita Esteves PhD, Phone:  [phone] RECOMMENDATIONS: 
 
your cholesterol is borderline but improved from last year. Continue to work on diet and exercise. Continue to increase the fiber in your diet and exercise 30 mi 3-5 times per week.  Limit the cholesterol in your diet as well.  Return in one year or sooner if needed.  
    
   
 
 
 
Please call me if you have any questions: [phone] Sincerely, Lab [facility]

## 2017-04-29 LAB
CHOLEST SERPL-MCNC: 200 MG/DL (ref 100–199)
HDLC SERPL-MCNC: 57 MG/DL
INTERPRETATION, 910389: NORMAL
LDLC SERPL CALC-MCNC: 119 MG/DL (ref 0–99)
TRIGL SERPL-MCNC: 121 MG/DL (ref 0–149)
VLDLC SERPL CALC-MCNC: 24 MG/DL (ref 5–40)

## 2017-05-01 NOTE — PROGRESS NOTES
Letter: your cholesterol is borderline but improved from last year. Continue to work on diet and exercise. Continue to increase the fiber in your diet and exercise 30 mi 3-5 times per week. Limit the cholesterol in your diet as well. Return in one year or sooner if needed.

## 2018-04-09 DIAGNOSIS — E03.9 ACQUIRED HYPOTHYROIDISM: ICD-10-CM

## 2018-04-09 RX ORDER — LEVOTHYROXINE SODIUM 88 UG/1
TABLET ORAL
Qty: 90 TAB | Refills: 3 | Status: SHIPPED | OUTPATIENT
Start: 2018-04-09 | End: 2019-06-27 | Stop reason: SDUPTHER

## 2018-06-08 ENCOUNTER — OFFICE VISIT (OUTPATIENT)
Dept: FAMILY MEDICINE CLINIC | Age: 63
End: 2018-06-08

## 2018-06-08 VITALS
OXYGEN SATURATION: 96 % | DIASTOLIC BLOOD PRESSURE: 73 MMHG | RESPIRATION RATE: 18 BRPM | WEIGHT: 135 LBS | HEIGHT: 62 IN | SYSTOLIC BLOOD PRESSURE: 106 MMHG | BODY MASS INDEX: 24.84 KG/M2 | HEART RATE: 79 BPM | TEMPERATURE: 98.5 F

## 2018-06-08 DIAGNOSIS — Z00.00 WELL WOMAN EXAM WITHOUT GYNECOLOGICAL EXAM: Primary | ICD-10-CM

## 2018-06-08 DIAGNOSIS — E03.9 ACQUIRED HYPOTHYROIDISM: ICD-10-CM

## 2018-06-08 NOTE — PATIENT INSTRUCTIONS
Well Visit, Women 48 to 72: Care Instructions  Your Care Instructions    Physical exams can help you stay healthy. Your doctor has checked your overall health and may have suggested ways to take good care of yourself. He or she also may have recommended tests. At home, you can help prevent illness with healthy eating, regular exercise, and other steps. Follow-up care is a key part of your treatment and safety. Be sure to make and go to all appointments, and call your doctor if you are having problems. It's also a good idea to know your test results and keep a list of the medicines you take. How can you care for yourself at home? · Reach and stay at a healthy weight. This will lower your risk for many problems, such as obesity, diabetes, heart disease, and high blood pressure. · Get at least 30 minutes of exercise on most days of the week. Walking is a good choice. You also may want to do other activities, such as running, swimming, cycling, or playing tennis or team sports. · Do not smoke. Smoking can make health problems worse. If you need help quitting, talk to your doctor about stop-smoking programs and medicines. These can increase your chances of quitting for good. · Protect your skin from too much sun. When you're outdoors from 10 a.m. to 4 p.m., stay in the shade or cover up with clothing and a hat with a wide brim. Wear sunglasses that block UV rays. Even when it's cloudy, put broad-spectrum sunscreen (SPF 30 or higher) on any exposed skin. · See a dentist one or two times a year for checkups and to have your teeth cleaned. · Wear a seat belt in the car. · Limit alcohol to 1 drink a day. Too much alcohol can cause health problems. Follow your doctor's advice about when to have certain tests. These tests can spot problems early. · Cholesterol.  Your doctor will tell you how often to have this done based on your age, family history, or other things that can increase your risk for heart attack and stroke. · Blood pressure. Have your blood pressure checked during a routine doctor visit. Your doctor will tell you how often to check your blood pressure based on your age, your blood pressure results, and other factors. · Mammogram. Ask your doctor how often you should have a mammogram, which is an X-ray of your breasts. A mammogram can spot breast cancer before it can be felt and when it is easiest to treat. · Pap test and pelvic exam. Ask your doctor how often you should have a Pap test. You may not need to have a Pap test as often as you used to. · Vision. Have your eyes checked every year or two or as often as your doctor suggests. Some experts recommend that you have yearly exams for glaucoma and other age-related eye problems starting at age 48. · Hearing. Tell your doctor if you notice any change in your hearing. You can have tests to find out how well you hear. · Diabetes. Ask your doctor whether you should have tests for diabetes. · Colon cancer. You should begin tests for colon cancer at age 48. You may have one of several tests. Your doctor will tell you how often to have tests based on your age and risk. Risks include whether you already had a precancerous polyp removed from your colon or whether your parents, sisters and brothers, or children have had colon cancer. · Thyroid disease. Talk to your doctor about whether to have your thyroid checked as part of a regular physical exam. Women have an increased chance of a thyroid problem. · Osteoporosis. You should begin tests for bone density at age 72. If you are younger than 72, ask your doctor whether you have factors that may increase your risk for this disease. You may want to have this test before age 72. · Heart attack and stroke risk. At least every 4 to 6 years, you should have your risk for heart attack and stroke assessed.  Your doctor uses factors such as your age, blood pressure, cholesterol, and whether you smoke or have diabetes to show what your risk for a heart attack or stroke is over the next 10 years. When should you call for help? Watch closely for changes in your health, and be sure to contact your doctor if you have any problems or symptoms that concern you. Where can you learn more? Go to http://nisreen-chetna.info/. Enter R821 in the search box to learn more about \"Well Visit, Women 50 to 72: Care Instructions. \"  Current as of: May 12, 2017  Content Version: 11.4  © 2736-8330 Healthwise, Incorporated. Care instructions adapted under license by [employer] (which disclaims liability or warranty for this information). If you have questions about a medical condition or this instruction, always ask your healthcare professional. [address] any warranty or liability for your use of this information.

## 2018-06-08 NOTE — PROGRESS NOTES
HPI:  Keiry Leon is a 66 y.o. female presenting for well woman exam. No concerns today. PMH: hypothyroidism. Denies symptoms such as constipation, weight gain, dry skin, fatigue. Intentional 6lb weight loss with diet and exercise over 1 year. Synthroid 88mg daily right now  Diet: good mix of proteins and vegetables    LMP: Currently menopausal for 10 yrs. No vaginal bleeding or spotting. Exercise: exercises daily: classes at the gym and swimming. Allergies- reviewed: Allergies   Allergen Reactions    Amoxicillin Other (comments)     Augment gave her diarrhea bad    Azithromycin Itching    Fish Derived Swelling     angioedema    Iodine Swelling    Shellfish Derived Other (comments)     Causes angioedema         Medications- reviewed:   Current Outpatient Prescriptions   Medication Sig    levothyroxine (SYNTHROID) 88 mcg tablet TAKE 1 TAB BY MOUTH DAILY (BEFORE BREAKFAST).  SUMAtriptan (IMITREX) 50 mg tablet     naproxen sodium (ALEVE) 220 mg cap Take  by mouth as needed.  mupirocin (BACTROBAN) 2 % ointment Apply  to affected area daily.  LOPERAMIDE HCL (IMODIUM PO) Take  by mouth.  ascorbic acid (VITAMIN C) 500 mg tablet Take  by mouth.  cholecalciferol (VITAMIN D3) 1,000 unit cap Take  by mouth daily.  biotin 2,500 mcg tab Take  by mouth.  B.infantis-B.ani-B.long-B.bifi 10-15 mg TbEC Take  by mouth.  hydrocortisone (ALA-JUAN RAMON) 1 % lotion Apply  to affected area two (2) times a day. use thin layer    FLAXSEED OIL PO Take  by mouth.  Epinephrine Base 0.3 mg/0.3 mL (1:1,000) Syrg 0.3 mg by Injection route once as needed for 1 dose. No current facility-administered medications for this visit.           Past Medical History- reviewed:  Past Medical History:   Diagnosis Date    Thyroid disease     hypothyroid         Past Surgical History- reviewed:   Past Surgical History:   Procedure Laterality Date    HX APPENDECTOMY      HX BUNIONECTOMY      HX GYN      BTL  TX EMBOLIZATION UTERINE FIBROID           Family History - reviewed:  History reviewed. No pertinent family history. Social History - reviewed:  Social History     Social History    Marital status:      Spouse name: N/A    Number of children: N/A    Years of education: N/A     Occupational History    Not on file. Social History Main Topics    Smoking status: Never Smoker    Smokeless tobacco: Never Used    Alcohol use No    Drug use: No    Sexual activity: Yes     Partners: Male     Birth control/ protection: None     Other Topics Concern    Not on file     Social History Narrative         Immunizations - reviewed:   Immunization History   Administered Date(s) Administered    Hep A Vaccine 01/22/2013, 02/25/2013    Hep B Vaccine 01/22/2013, 02/25/2013    Influenza Vaccine 10/13/2016    Influenza Vaccine Whole 10/31/2012    Tdap 12/21/2012     Health Maintenance reviewed -  Pap smear: 10/2017  Mammogram: 11/2017  Colonoscopy: due in 1 year  Hepatitis C testing 2017      Review of Systems   CONSTITUTIONAL: Neg. All other symptoms reviewed and are negative.        Physical Exam  Visit Vitals    /73    Pulse 79    Temp 98.5 °F (36.9 °C) (Oral)    Resp 18    Ht 5' 2\" (1.575 m)    Wt 135 lb (61.2 kg)    LMP 09/20/2007    SpO2 96%    BMI 24.69 kg/m2       General appearance - alert, well appearing, and in no distress  Eyes - pupils equal and reactive, extraocular eye movements intact  Ears - bilateral TM's and external ear canals normal  Nose - normal and patent, no erythema, discharge or polyps  Mouth - mucous membranes moist, pharynx normal without lesions  Neck - supple, no significant adenopathy  Chest - clear to auscultation, no wheezes, rales or rhonchi, symmetric air entry  Heart - normal rate, regular rhythm, normal S1, S2, no murmurs, rubs, clicks or gallops  Abdomen - soft, nontender, nondistended, no masses or organomegaly  Neurological - alert, oriented, normal speech, no focal findings or movement disorder noted  Musculoskeletal - no joint tenderness, deformity or swelling  Extremities - peripheral pulses normal, no pedal edema, no clubbing or cyanosis  Skin - normal coloration and turgor, no rashes, no suspicious skin lesions noted      Assessment/Plan:    ICD-10-CM ICD-9-CM    1. Well woman exam without gynecological exam Z00.00 V70.0 CBC W/O DIFF      METABOLIC PANEL, COMPREHENSIVE      LIPID PANEL   2. Acquired hypothyroidism E03.9 244.9 TSH 3RD GENERATION         · Counseled re: diet, exercise, healthy lifestyle. Counseled specifically on dyslipidemia. · Appropriate labs, vaccines, imaging studies, and referrals ordered as listed above    · Pt will f/u next week for labs, not fasting right now. Follow-up Disposition: Not on File      I have discussed the diagnosis with the patient and the intended plan as seen in the above orders. The patient has received an after-visit summary and questions were answered concerning future plans. I have discussed medication side effects and warnings with the patient as well. Informed pt to return to the office if new symptoms arise. Loretta Atkins MD  Family Medicine Resident    Patient seen and discussed with Dr. Vidal Pires, attending physician.

## 2018-06-08 NOTE — MR AVS SNAPSHOT
[address] 
[phone] Patient: Jose Todd MRN: [id number] SUNG:[id number] Visit Information Date & Time Provider Department Dept. Phone Encounter #  
 6/8/2018  2:00 PM Spencer Simpson MD [address] [phone] [id number] Upcoming Health Maintenance Date Due FOBT Q 1 YEAR AGE 50-75 5/21/2007 ZOSTER VACCINE AGE 60> 3/21/2017 Influenza Age 5 to Adult 8/1/2018 BREAST CANCER SCRN MAMMOGRAM 1/30/2019 PAP AKA CERVICAL CYTOLOGY 4/24/2020 DTaP/Tdap/Td series (2 - Td) 12/21/2022 Allergies as of 6/8/2018  Review Complete On: 6/8/2018 By: Melissa Garcia LPN Severity Noted Reaction Type Reactions Amoxicillin Medium 11/16/2016    Other (comments) Augment gave her diarrhea bad Azithromycin  09/20/2010    Itching Fish Derived  09/20/2010    Swelling  
 angioedema Iodine  09/20/2010    Swelling Shellfish Derived  10/13/2010    Other (comments) Causes angioedema Current Immunizations  Reviewed on 10/27/2016 Name Date Hep A Vaccine 2/25/2013, 1/22/2013 Hep B Vaccine 2/25/2013, 1/22/2013 Influenza Vaccine 10/13/2016 Influenza Vaccine Whole 10/31/2012 Tdap 12/21/2012 Not reviewed this visit You Were Diagnosed With   
  
 Codes Comments Well woman exam without gynecological exam    -  Primary ICD-10-CM: Z00.00 ICD-9-CM: V70.0 Acquired hypothyroidism     ICD-10-CM: E03.9 ICD-9-CM: 872. 9 Vitals BP Pulse Temp Resp Height(growth percentile) Weight(growth percentile) 106/73 79 98.5 °F (36.9 °C) (Oral) 18 5' 2\" (1.575 m) 135 lb (61.2 kg) LMP SpO2 BMI OB Status Smoking Status 09/20/2007 96% 24.69 kg/m2 Postmenopausal Never Smoker BMI and BSA Data Body Mass Index Body Surface Area  
 24.69 kg/m 2 1.64 m 2 Preferred Pharmacy Pharmacy Name Phone [id number] AT [facility] [phone] Your Updated Medication List  
  
   
This list is accurate as of 6/8/18  2:48 PM.  Always use your most recent med list.  
  
  
  
  
 ALEVE 220 mg Cap Generic drug:  naproxen sodium Take  by mouth as needed. ascorbic acid (vitamin C) 500 mg tablet Commonly known as:  VITAMIN C Take  by mouth. B.infantis-B.ani-B.long-B.bifi 10-15 mg Tbec Take  by mouth.  
  
 biotin 2,500 mcg Tab Take  by mouth. cholecalciferol 1,000 unit Cap Commonly known as:  VITAMIN D3 Take  by mouth daily. Epinephrine Base 0.3 mg/0.3 mL Syrg  
0.3 mg by Injection route once as needed for 1 dose. FLAXSEED OIL PO Take  by mouth. hydrocortisone 1 % lotion Commonly known as:  ALA-JUAN RAMON Apply  to affected area two (2) times a day. use thin layer IMODIUM PO Take  by mouth.  
  
 levothyroxine 88 mcg tablet Commonly known as:  SYNTHROID  
TAKE 1 TAB BY MOUTH DAILY (BEFORE BREAKFAST). mupirocin 2 % ointment Commonly known as:  [employer] Apply  to affected area daily. SUMAtriptan 50 mg tablet Commonly known as:  IMITREX Patient Instructions Well Visit, Women 48 to 72: Care Instructions Your Care Instructions Physical exams can help you stay healthy. Your doctor has checked your overall health and may have suggested ways to take good care of yourself. He or she also may have recommended tests. At home, you can help prevent illness with healthy eating, regular exercise, and other steps. Follow-up care is a key part of your treatment and safety. Be sure to make and go to all appointments, and call your doctor if you are having problems. It's also a good idea to know your test results and keep a list of the medicines you take. How can you care for yourself at home? · Reach and stay at a healthy weight.  This will lower your risk for many problems, such as obesity, diabetes, heart disease, and high blood pressure. · Get at least 30 minutes of exercise on most days of the week. Walking is a good choice. You also may want to do other activities, such as running, swimming, cycling, or playing tennis or team sports. · Do not smoke. Smoking can make health problems worse. If you need help quitting, talk to your doctor about stop-smoking programs and medicines. These can increase your chances of quitting for good. · Protect your skin from too much sun. When you're outdoors from 10 a.m. to 4 p.m., stay in the shade or cover up with clothing and a hat with a wide brim. Wear sunglasses that block UV rays. Even when it's cloudy, put broad-spectrum sunscreen (SPF 30 or higher) on any exposed skin. · See a dentist one or two times a year for checkups and to have your teeth cleaned. · Wear a seat belt in the car. · Limit alcohol to 1 drink a day. Too much alcohol can cause health problems. Follow your doctor's advice about when to have certain tests. These tests can spot problems early. · Cholesterol. Your doctor will tell you how often to have this done based on your age, family history, or other things that can increase your risk for heart attack and stroke. · Blood pressure. Have your blood pressure checked during a routine doctor visit. Your doctor will tell you how often to check your blood pressure based on your age, your blood pressure results, and other factors. · Mammogram. Ask your doctor how often you should have a mammogram, which is an X-ray of your breasts. A mammogram can spot breast cancer before it can be felt and when it is easiest to treat. · Pap test and pelvic exam. Ask your doctor how often you should have a Pap test. You may not need to have a Pap test as often as you used to. · Vision. Have your eyes checked every year or two or as often as your doctor suggests.  Some experts recommend that you have yearly exams for glaucoma and other age-related eye problems starting at age 48. · Hearing. Tell your doctor if you notice any change in your hearing. You can have tests to find out how well you hear. · Diabetes. Ask your doctor whether you should have tests for diabetes. · Colon cancer. You should begin tests for colon cancer at age 48. You may have one of several tests. Your doctor will tell you how often to have tests based on your age and risk. Risks include whether you already had a precancerous polyp removed from your colon or whether your parents, sisters and brothers, or children have had colon cancer. · Thyroid disease. Talk to your doctor about whether to have your thyroid checked as part of a regular physical exam. Women have an increased chance of a thyroid problem. · Osteoporosis. You should begin tests for bone density at age 72. If you are younger than 72, ask your doctor whether you have factors that may increase your risk for this disease. You may want to have this test before age 72. · Heart attack and stroke risk. At least every 4 to 6 years, you should have your risk for heart attack and stroke assessed. Your doctor uses factors such as your age, blood pressure, cholesterol, and whether you smoke or have diabetes to show what your risk for a heart attack or stroke is over the next 10 years. When should you call for help? Watch closely for changes in your health, and be sure to contact your doctor if you have any problems or symptoms that concern you. Where can you learn more? Go to http://nisreen-chetna.info/. Enter W844 in the search box to learn more about \"Well Visit, Women 50 to 72: Care Instructions. \" Current as of: May 12, 2017 Content Version: 11.4 © 7780-2493 Healthwise, Incorporated. Care instructions adapted under license by [employer] (which disclaims liability or warranty for this information).  If you have questions about a medical condition or this instruction, always ask your healthcare professional. [address] any warranty or liability for your use of this information. Introducing [facility] & HEALTH SERVICES! Dear Hien Dobbs: Thank you for requesting a [employer] account. Our records indicate that you already have an active [employer] account. You can access your account anytime at [email or web address] Did you know that you can access your hospital and ER discharge instructions at any time in [employer]? You can also review all of your test results from your hospital stay or ER visit. Additional Information If you have questions, please visit the Frequently Asked Questions section of the [employer] website at [email or web address] Remember, [employer] is NOT to be used for urgent needs. For medical emergencies, dial 911. Now available from your iPhone and Android! Please provide this summary of care documentation to your next provider. Your primary care clinician is listed as Norma Reddy. If you have any questions after today's visit, please call [phone].

## 2018-06-08 NOTE — PROGRESS NOTES
1. Have you been to the ER, urgent care clinic since your last visit? Hospitalized since your last visit? Yes, ER in [place]. Eye issues (flashes of light)    2. Have you seen or consulted any other health care providers outside of the [address] [place] [address] since your last visit? Include any pap smears or colon screening. No    Chief Complaint   Patient presents with    Complete Physical    Other     Thyroid check         . Blood pressure 106/73, pulse 79, temperature 98.5 °F (36.9 °C), temperature source Oral, resp. rate 18, height 5' 2\" (1.575 m), weight 135 lb (61.2 kg), last menstrual period 09/20/2007, SpO2 96 %.

## 2018-06-12 NOTE — PROGRESS NOTES
I reviewed with the resident the medical history and the resident's findings on the physical examination. I discussed with the resident the patient's diagnosis and concur with the plan. Pap and mammogram UTD. Would recommend doing pap in one year which would be her last pap smear.

## 2018-06-13 LAB
ALBUMIN SERPL-MCNC: 4.2 G/DL (ref 3.6–4.8)
ALBUMIN/GLOB SERPL: 2.1 {RATIO} (ref 1.2–2.2)
ALP SERPL-CCNC: 58 IU/L (ref 39–117)
ALT SERPL-CCNC: 18 IU/L (ref 0–32)
AST SERPL-CCNC: 17 IU/L (ref 0–40)
BILIRUB SERPL-MCNC: 0.7 MG/DL (ref 0–1.2)
BUN SERPL-MCNC: 17 MG/DL (ref 8–27)
BUN/CREAT SERPL: 20 (ref 12–28)
CALCIUM SERPL-MCNC: 9.2 MG/DL (ref 8.7–10.3)
CHLORIDE SERPL-SCNC: 101 MMOL/L (ref 96–106)
CHOLEST SERPL-MCNC: 204 MG/DL (ref 100–199)
CO2 SERPL-SCNC: 28 MMOL/L (ref 20–29)
CREAT SERPL-MCNC: 0.83 MG/DL (ref 0.57–1)
ERYTHROCYTE [DISTWIDTH] IN BLOOD BY AUTOMATED COUNT: 13.6 % (ref 12.3–15.4)
GFR SERPLBLD CREATININE-BSD FMLA CKD-EPI: 76 ML/MIN/1.73
GFR SERPLBLD CREATININE-BSD FMLA CKD-EPI: 88 ML/MIN/1.73
GLOBULIN SER CALC-MCNC: 2 G/DL (ref 1.5–4.5)
GLUCOSE SERPL-MCNC: 87 MG/DL (ref 65–99)
HCT VFR BLD AUTO: 40.6 % (ref 34–46.6)
HDLC SERPL-MCNC: 60 MG/DL
HGB BLD-MCNC: 13.6 G/DL (ref 11.1–15.9)
INTERPRETATION, 910389: NORMAL
LDLC SERPL CALC-MCNC: 126 MG/DL (ref 0–99)
MCH RBC QN AUTO: 31.3 PG (ref 26.6–33)
MCHC RBC AUTO-ENTMCNC: 33.5 G/DL (ref 31.5–35.7)
MCV RBC AUTO: 94 FL (ref 79–97)
PLATELET # BLD AUTO: 259 X10E3/UL (ref 150–379)
POTASSIUM SERPL-SCNC: 4.4 MMOL/L (ref 3.5–5.2)
PROT SERPL-MCNC: 6.2 G/DL (ref 6–8.5)
RBC # BLD AUTO: 4.34 X10E6/UL (ref 3.77–5.28)
SODIUM SERPL-SCNC: 140 MMOL/L (ref 134–144)
TRIGL SERPL-MCNC: 91 MG/DL (ref 0–149)
TSH SERPL DL<=0.005 MIU/L-ACNC: 3.2 UIU/ML (ref 0.45–4.5)
VLDLC SERPL CALC-MCNC: 18 MG/DL (ref 5–40)
WBC # BLD AUTO: 5.4 X10E3/UL (ref 3.4–10.8)

## 2018-06-18 ENCOUNTER — TELEPHONE (OUTPATIENT)
Dept: FAMILY MEDICINE CLINIC | Age: 63
End: 2018-06-18

## 2018-06-18 NOTE — TELEPHONE ENCOUNTER
[phone]    Patient has viewed the lab results on my chart but has not heard from the physician regarding if there will be any  medication change. Said she is shortly going out of town for three weeks and wants to know now.

## 2018-06-18 NOTE — PROGRESS NOTES
CBC and CMP wnl. TSH wnl: can continue synthroid 88mcg daily. LDL: 126, ASCVD risk of 3.1% NO benefit from statin use.

## 2018-10-03 ENCOUNTER — OFFICE VISIT (OUTPATIENT)
Dept: FAMILY MEDICINE CLINIC | Age: 63
End: 2018-10-03

## 2018-10-03 VITALS
WEIGHT: 139.4 LBS | HEIGHT: 62 IN | BODY MASS INDEX: 25.65 KG/M2 | DIASTOLIC BLOOD PRESSURE: 89 MMHG | TEMPERATURE: 97.5 F | RESPIRATION RATE: 18 BRPM | SYSTOLIC BLOOD PRESSURE: 113 MMHG | HEART RATE: 69 BPM | OXYGEN SATURATION: 95 %

## 2018-10-03 DIAGNOSIS — T78.02XS ANAPHYLACTIC SHOCK DUE TO SHELLFISH, SEQUELA: ICD-10-CM

## 2018-10-03 DIAGNOSIS — S61.210A LACERATION OF RIGHT INDEX FINGER WITHOUT FOREIGN BODY WITHOUT DAMAGE TO NAIL, INITIAL ENCOUNTER: Primary | ICD-10-CM

## 2018-10-03 DIAGNOSIS — T78.2XXS ANAPHYLAXIS, SEQUELA: ICD-10-CM

## 2018-10-03 RX ORDER — EPINEPHRINE 0.3 MG/.3ML
0.3 INJECTION SUBCUTANEOUS
Qty: 1 SYRINGE | Refills: 0 | Status: SHIPPED | OUTPATIENT
Start: 2018-10-03 | End: 2018-10-03

## 2018-10-03 NOTE — PROGRESS NOTES
Jag Blanchard is a 66 y.o. female who presents with a chief complaint of a cut on her right index finger. She reports that she was doing metal work with some clean pliers today at about 10:30, and cut the lateral side of the distal index finger. Had some pain afterward, but otherwise has been doing okay. Has had a band-aid on it since this morning, but was worried it may still be bleeding mildly. Pain has significantly improved. Denies any injuries elsewhere. No history of any other metal injuries. Doesn't know when last tetanus was (records show 2012). No history of significant skin infections. Also needing an epipen refill. Has not had to use them recently, but concerned that it is expiring. No wheezing, difficulty breathing or skin rash breakout    Meds:    Current Outpatient Prescriptions:     levothyroxine (SYNTHROID) 88 mcg tablet, TAKE 1 TAB BY MOUTH DAILY (BEFORE BREAKFAST). , Disp: 90 Tab, Rfl: 3    Epinephrine Base 0.3 mg/0.3 mL (1:1,000) Syrg, 0.3 mg by Injection route once as needed for 1 dose., Disp: 1 Syringe, Rfl: 1    mupirocin (BACTROBAN) 2 % ointment, Apply  to affected area daily. , Disp: 22 g, Rfl: 0    LOPERAMIDE HCL (IMODIUM PO), Take  by mouth., Disp: , Rfl:     ascorbic acid (VITAMIN C) 500 mg tablet, Take  by mouth., Disp: , Rfl:     cholecalciferol (VITAMIN D3) 1,000 unit cap, Take  by mouth daily. , Disp: , Rfl:     biotin 2,500 mcg tab, Take  by mouth., Disp: , Rfl:     B.infantis-B.ani-B.long-B.bifi 10-15 mg TbEC, Take  by mouth., Disp: , Rfl:     hydrocortisone (ALA-JUAN RAMON) 1 % lotion, Apply  to affected area two (2) times a day. use thin layer, Disp: 60 mL, Rfl: 0    SUMAtriptan (IMITREX) 50 mg tablet, , Disp: , Rfl: 6    FLAXSEED OIL PO, Take  by mouth., Disp: , Rfl:     naproxen sodium (ALEVE) 220 mg cap, Take  by mouth as needed. , Disp: , Rfl:    Allergies:   Allergies   Allergen Reactions    Amoxicillin Other (comments)     Augment gave her diarrhea bad    Azithromycin Itching    Fish Derived Swelling     angioedema    Iodine Swelling    Shellfish Derived Other (comments)     Causes angioedema     Smoker:   History   Smoking Status    Never Smoker   Smokeless Tobacco    Never Used     ETOH:   History   Alcohol Use No       FH:    No family history on file. ROS:  General/Constitutional:  No headache, fever, fatigue  Eyes:  No redness, pruritis, pain, visual changes  Ears:  No pain, loss or changes in hearing  Neck:  No swelling, masses, stiffness, pain, or limited movement  Cardiac:   No chest pain, palpitations  Respiratory:  No cough or shortness of breath    GI:  No nausea/vomiting, diarrhea, abdominal pain, bloody or dark stools           Physical Exam:  Visit Vitals    /89 (BP 1 Location: Left arm, BP Patient Position: Sitting)    Pulse 69    Temp 97.5 °F (36.4 °C) (Oral)    Resp 18    Ht 5' 2\" (1.575 m)    Wt 139 lb 6.4 oz (63.2 kg)    LMP 09/20/2007    SpO2 95%    BMI 25.5 kg/m2       GEN: No apparent distress. Alert and oriented and responds to all questions appropriately. EXT: Well perfused. No edema. Right 1st index finger with small superficial laceration (2-3 mm) in a C shape and no active bleeding. No surrounding erythema or edema. Hand otherwise without injury  SKIN: No obvious rashes. Assessment/Plan:    ICD-10-CM ICD-9-CM    1. Laceration of right index finger without foreign body without damage to nail, initial encounter S61.210A 883.0    2. 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 02XS 909.1 EPINEPHrine (EPIPEN) 0.3 mg/0.3 mL injection   3. Anaphylaxis, sequela T78. 2XXS 909.9 EPINEPHrine (EPIPEN) 0.3 mg/0.3 mL injection     Laceration  Wound cleaned and inspected. Appears to be small and well approximated at present. Tetanus injection is up to date for a small, clean wound. Discussed all of this with patient, as well as need to keep clean and covered.   Patient will monitor closely for any erythema, edema, or discharge. Instructed to apply neosporin over area to help prevent infection as well. Anaphylaxis history  Has not had to use epipen, but concerned that it is . Refill ordered.

## 2018-10-03 NOTE — PROGRESS NOTES
Acosta Mcduffie is a 66 y.o. female  Chief Complaint   Patient presents with    Laceration     patient cut finger on are tools10/3/18, states bleeding lasted longer than expected    Medication Refill     epi pen     Visit Vitals    /89 (BP 1 Location: Left arm, BP Patient Position: Sitting)    Pulse 69    Temp 97.5 °F (36.4 °C) (Oral)    Resp 18    Ht 5' 2\" (1.575 m)    Wt 139 lb 6.4 oz (63.2 kg)    LMP 09/20/2007    SpO2 95%    BMI 25.5 kg/m2     1. Have you been to the ER, urgent care clinic since your last visit? Hospitalized since your last visit? No    2. Have you seen or consulted any other health care providers outside of the [address] [place] [address] since your last visit? Include any pap smears or colon screening.  No  Health Maintenance Due   Topic Date Due    Shingrix Vaccine Age 50> (1 of 2) 05/21/2007    FOBT Q 1 YEAR AGE 50-75  05/21/2007    Influenza Age 5 to Adult  08/01/2018    BREAST CANCER SCRN MAMMOGRAM  01/30/2019

## 2019-02-26 ENCOUNTER — OFFICE VISIT (OUTPATIENT)
Dept: FAMILY MEDICINE CLINIC | Age: 64
End: 2019-02-26

## 2019-02-26 VITALS
HEART RATE: 86 BPM | WEIGHT: 133 LBS | RESPIRATION RATE: 16 BRPM | TEMPERATURE: 98.5 F | HEIGHT: 62 IN | DIASTOLIC BLOOD PRESSURE: 73 MMHG | OXYGEN SATURATION: 98 % | BODY MASS INDEX: 24.48 KG/M2 | SYSTOLIC BLOOD PRESSURE: 119 MMHG

## 2019-02-26 DIAGNOSIS — M25.561 RIGHT KNEE PAIN, UNSPECIFIED CHRONICITY: Primary | ICD-10-CM

## 2019-02-26 RX ORDER — LEVOTHYROXINE SODIUM 88 UG/1
TABLET ORAL
COMMUNITY
Start: 2014-04-07 | End: 2019-02-26 | Stop reason: SDUPTHER

## 2019-02-26 NOTE — PROGRESS NOTES
Chief Complaint   Patient presents with    Knee Pain     Pt states right knee pain for couples of months hurts when she does activities    [address] [facility]  OFFICE PROCEDURE PROGRESS NOTE        Chart reviewed for the following:   I, Dr. Iker Shaw, have reviewed the History, Physical and updated the Allergic reactions for [address] performed immediately prior to start of procedure:   IDr. Iker, have performed the following reviews on Laura Doty prior to the start of the procedure:            * Patient was identified by name and date of birth   * Agreement on procedure being performed was verified  * Risks and Benefits explained to the patient  * Procedure site verified and marked as necessary  * Patient was positioned for comfort  * Consent was signed and verified     Time: 1:37pm       Date of procedure: 2/26/2019    Procedure performed by:  Artem Feldman MD    Provider assisted by:  Bouchra Vernon LPN    Patient assisted by: daughter    How tolerated by patient: tolerated the procedure well with no complications

## 2019-02-26 NOTE — PROGRESS NOTES
HPI:  Ketty Scott is a 66 y.o. female who presents with right knee pain 1-2 weeks. No injury or trauma. No change in activities. No swelling. Taking tylenol PRN. Sx improving over the last couple of days. Past Medical History:   Diagnosis Date    Thyroid disease     hypothyroid       Current Outpatient Medications:     levothyroxine (SYNTHROID) 88 mcg tablet, TAKE 1 TAB BY MOUTH DAILY (BEFORE BREAKFAST). , Disp: 90 Tab, Rfl: 3    ascorbic acid (VITAMIN C) 500 mg tablet, Take  by mouth., Disp: , Rfl:     cholecalciferol (VITAMIN D3) 1,000 unit cap, Take  by mouth daily. , Disp: , Rfl:     biotin 2,500 mcg tab, Take  by mouth., Disp: , Rfl:     B.infantis-B.ani-B.long-B.bifi 10-15 mg TbEC, Take  by mouth., Disp: , Rfl:     FLAXSEED OIL PO, Take  by mouth., Disp: , Rfl:     mupirocin (BACTROBAN) 2 % ointment, Apply  to affected area daily. , Disp: 22 g, Rfl: 0    LOPERAMIDE HCL (IMODIUM PO), Take  by mouth., Disp: , Rfl:     hydrocortisone (ALA-JUAN RAMON) 1 % lotion, Apply  to affected area two (2) times a day. use thin layer, Disp: 60 mL, Rfl: 0    SUMAtriptan (IMITREX) 50 mg tablet, , Disp: , Rfl: 6    naproxen sodium (ALEVE) 220 mg cap, Take  by mouth as needed. , Disp: , Rfl:   Allergies   Allergen Reactions    Amoxicillin Other (comments)     Augment gave her diarrhea bad    Azithromycin Itching    Fish Derived Swelling     angioedema    Iodine Swelling    Shellfish Derived Other (comments)     Causes angioedema     Past Medical History:   Diagnosis Date    Thyroid disease     hypothyroid     History reviewed. No pertinent family history. ROS: As per HPI otherwise negative. Objective:   Visit Vitals  /73 (BP 1 Location: Right arm, BP Patient Position: Sitting)   Pulse 86   Temp 98.5 °F (36.9 °C) (Oral)   Resp 16   Ht 5' 2\" (1.575 m)   Wt 133 lb (60.3 kg)   LMP 09/20/2007   SpO2 98%   BMI 24.33 kg/m²     Gen: Well appearing. No apparent distress. Alert and oriented.   Responds to all questions appropriately. Lungs: No labored respirations. Talking in complete sentences without difficulty. Musculoskeletal:  Knee: right  Knee Effusion: None  Quadriceps atrophy: None   Popliteal (Bakers) Cyst: Negative   Patellofemoral crepitus: Negative  Q angle:     ROM:  Flexion: 130  Extension: 0   Hip IR/ER: FROM without pain    Dynamic Test:  Gait: Normal     Palpation:   Patella tenderness: None  Patellar tendon tenderness: None  Quad tendon tenderness: None  Medial joint line tenderness: None  Lateral joint line tenderness: None  MCL tenderness: None  LCL tenderness: None  Medial facet tenderness: None  Lateral facet tenderness: None  Condyle tenderness: None  Tibia tubercle tenderness: None  Proximal fibula tenderness: None  Plica tenderness: None  Prepatellar bursa tenderness: None  Pes bursa tenderness: None  ITB tenderness: None    Ligament/Meniscal Exam:  Patellar Grind: Negative   Patellar apprehension (medial/lateral): Negative   Lochman: Negative, with good endpoint   Valgus stress: Negative with good endpoint   Varus stress: Negative with good endpoint     Strength (0-5/5):   Flexion: Left: 5/5    Right: 5/5    Extension: Left: 5/5    Right: 5/5    Hip abduction: 5/5    Hip adduction: 5/5      Neuro/Vascular : Pulses intact, no edema, and neurologically intact . Skin: No obvious rash or skin breakdown. Imaging: Radiographs of the right knee personally reviewed and demonstrates no obvious fracture or dislocation. ASSESSMENT:    ICD-10-CM ICD-9-CM    1. Right knee pain, unspecified chronicity M25.561 719.46 XR KNEE RT MIN 4 V     Knee pain likely due to mild DJD. Sx improving. Discussed treatment options and will proceed with conservative management. PLAN:    1. Home Exercise Program as per handout. 2. Ice 15 minutes, three times a day PRN and after exercise. Can alternate with heat for 15 minutes. Medications:    1.   Naproxin (Aleve): 220mg 1-2 tablets twice a day PRN.   2. Acetaminophen (Tylenol):  500mg 1-2 tablets every 6 hours as needed for pain.     RTC: PRN

## 2019-03-06 ENCOUNTER — TELEPHONE (OUTPATIENT)
Dept: FAMILY MEDICINE CLINIC | Age: 64
End: 2019-03-06

## 2019-03-08 NOTE — TELEPHONE ENCOUNTER
Patient apologizes for missing your call. She states RX would be [place] in [facility].

## 2019-04-23 NOTE — TELEPHONE ENCOUNTER
Ozzie Pabon [facility] Front Office         Pt is requesting a written Rx for an EPI  Pen in Pakistani due to her going to [facility] 04/30/2019. Best contact is [phone].

## 2019-04-26 DIAGNOSIS — T78.02XS ANAPHYLACTIC SHOCK DUE TO SHELLFISH, SEQUELA: ICD-10-CM

## 2019-04-26 DIAGNOSIS — T78.2XXS ANAPHYLAXIS, SEQUELA: ICD-10-CM

## 2019-04-26 RX ORDER — EPINEPHRINE 0.3 MG/.3ML
INJECTION SUBCUTANEOUS
Refills: 0 | OUTPATIENT
Start: 2019-04-26

## 2019-06-27 ENCOUNTER — OFFICE VISIT (OUTPATIENT)
Dept: FAMILY MEDICINE CLINIC | Age: 64
End: 2019-06-27

## 2019-06-27 VITALS
DIASTOLIC BLOOD PRESSURE: 73 MMHG | SYSTOLIC BLOOD PRESSURE: 109 MMHG | HEIGHT: 62 IN | HEART RATE: 68 BPM | BODY MASS INDEX: 24.84 KG/M2 | RESPIRATION RATE: 16 BRPM | OXYGEN SATURATION: 99 % | TEMPERATURE: 98.6 F | WEIGHT: 135 LBS

## 2019-06-27 DIAGNOSIS — E03.9 ACQUIRED HYPOTHYROIDISM: ICD-10-CM

## 2019-06-27 DIAGNOSIS — B00.1 RECURRENT COLD SORES: ICD-10-CM

## 2019-06-27 DIAGNOSIS — T78.2XXS ANAPHYLAXIS, SEQUELA: ICD-10-CM

## 2019-06-27 DIAGNOSIS — Z00.00 ANNUAL PHYSICAL EXAM: Primary | ICD-10-CM

## 2019-06-27 PROBLEM — Z78.0 MENOPAUSE: Status: ACTIVE | Noted: 2018-11-13

## 2019-06-27 RX ORDER — VALACYCLOVIR HYDROCHLORIDE 1 G/1
TABLET, FILM COATED ORAL
Qty: 30 TAB | Refills: 0 | Status: SHIPPED | OUTPATIENT
Start: 2019-06-27 | End: 2019-06-27 | Stop reason: SDUPTHER

## 2019-06-27 RX ORDER — VALACYCLOVIR HYDROCHLORIDE 1 G/1
TABLET, FILM COATED ORAL
Qty: 30 TAB | Refills: 0 | Status: SHIPPED | OUTPATIENT
Start: 2019-06-27 | End: 2019-07-29 | Stop reason: SDUPTHER

## 2019-06-27 RX ORDER — ACYCLOVIR 50 MG/G
OINTMENT TOPICAL
Qty: 5 G | Refills: 0 | Status: SHIPPED | OUTPATIENT
Start: 2019-06-27 | End: 2019-06-27 | Stop reason: SDUPTHER

## 2019-06-27 RX ORDER — ACYCLOVIR 50 MG/G
OINTMENT TOPICAL
Qty: 5 G | Refills: 0 | Status: SHIPPED | OUTPATIENT
Start: 2019-06-27 | End: 2022-04-01

## 2019-06-27 RX ORDER — LEVOTHYROXINE SODIUM 88 UG/1
88 TABLET ORAL
Qty: 90 TAB | Refills: 3 | Status: SHIPPED | OUTPATIENT
Start: 2019-06-27 | End: 2020-06-18 | Stop reason: SDUPTHER

## 2019-06-27 RX ORDER — EPINEPHRINE 0.3 MG/.3ML
0.3 INJECTION SUBCUTANEOUS
Qty: 2 SYRINGE | Refills: 1 | Status: SHIPPED | OUTPATIENT
Start: 2019-06-27 | End: 2019-06-27

## 2019-06-27 RX ORDER — LEVOTHYROXINE SODIUM 88 UG/1
88 TABLET ORAL
Qty: 90 TAB | Refills: 3 | Status: SHIPPED | OUTPATIENT
Start: 2019-06-27 | End: 2019-06-27 | Stop reason: SDUPTHER

## 2019-06-27 RX ORDER — EPINEPHRINE 0.3 MG/.3ML
0.3 INJECTION SUBCUTANEOUS
Qty: 2 SYRINGE | Refills: 1 | Status: SHIPPED | OUTPATIENT
Start: 2019-06-27 | End: 2019-06-27 | Stop reason: SDUPTHER

## 2019-06-27 NOTE — PATIENT INSTRUCTIONS
Hypothyroidism: Care Instructions  Your Care Instructions    You have hypothyroidism, which means that your body is not making enough thyroid hormone. This hormone helps your body use energy. If your thyroid level is low, you may feel tired, be constipated, have an increase in your blood pressure, or have dry skin or memory problems. You may also get cold easily, even when it is warm. Women with low thyroid levels may have heavy menstrual periods. A blood test to find your thyroid-stimulating hormone (TSH) level is used to check for hypothyroidism. A high TSH level may mean that you have low thyroid. When your body is not making enough thyroid hormone, TSH levels rise in an effort to make the body produce more. The treatment for hypothyroidism is to take thyroid hormone pills. You should start to feel better in 1 to 2 weeks. But it can take several months to see changes in the TSH level. You will need regular visits with your doctor to make sure you have the right dose of medicine. Most people need treatment for the rest of their lives. You will need to see your doctor regularly to have blood tests and to make sure you are doing well. Follow-up care is a key part of your treatment and safety. Be sure to make and go to all appointments, and call your doctor if you are having problems. It's also a good idea to know your test results and keep a list of the medicines you take. How can you care for yourself at home? · Take your thyroid hormone medicine exactly as prescribed. Call your doctor if you think you are having a problem with your medicine. Most people do not have side effects if they take the right amount of medicine regularly. ? Take the medicine 30 minutes before breakfast, and do not take it with calcium, vitamins, or iron. ? Do not take extra doses of your thyroid medicine. It will not help you get better any faster, and it may cause side effects.   ? If you forget to take a dose, do NOT take a double dose of medicine. Take your usual dose the next day. · Tell your doctor about all prescription, herbal, or over-the-counter products you take. · Take care of yourself. Eat a healthy diet, get enough sleep, and get regular exercise. When should you call for help? Call 911 anytime you think you may need emergency care. For example, call if:    · You passed out (lost consciousness).     · You have severe trouble breathing.     · You have a very slow heartbeat (less than 60 beats a minute).     · You have a low body temperature (95°F or below).    Call your doctor now or seek immediate medical care if:    · You feel tired, sluggish, or weak.     · You have trouble remembering things or concentrating.     · You do not begin to feel better 2 weeks after starting your medicine.    Watch closely for changes in your health, and be sure to contact your doctor if you have any problems. Where can you learn more? Go to http://nisreen-chetna.info/. Enter P873 in the search box to learn more about \"Hypothyroidism: Care Instructions. \"  Current as of: March 14, 2018  Content Version: 11.9  © 6287-8816 [employer]. Care instructions adapted under license by [employer] (which disclaims liability or warranty for this information). If you have questions about a medical condition or this instruction, always ask your healthcare professional. [place] [address] any warranty or liability for your use of this information. Cold Sores: Care Instructions  Your Care Instructions  Cold sores are clusters of small blisters on the lip and skin around or inside the mouth. Often the first sign of a cold sore is a spot that tingles, burns, or itches. A blister usually forms within 24 hours. The skin around the blisters can be red and inflamed. The blisters can break open, weep a clear fluid, and then scab over after a few days.  Cold sores most often heal in 7 to 10 days without a scar. They are sometimes called fever blisters. Cold sores are caused by a virus called the herpes simplex virus. This virus also causes some cases of genital herpes. The virus can spread from a sore in the genital area to the lips. Or it can spread from a cold sore on the lips to the genital area. Cold sores most often go away on their own. But if they are severe, embarrass you, or cause pain, your doctor may prescribe antiviral medicine to relieve pain and help prevent outbreaks. Follow-up care is a key part of your treatment and safety. Be sure to make and go to all appointments, and call your doctor if you are having problems. It's also a good idea to know your test results and keep a list of the medicines you take. How can you care for yourself at home? · Wash your hands often. And try not to touch your cold sores. This will help to avoid spreading the virus to your eyes or genital area, or to other people. This is more likely to happen if this is your first cold sore outbreak. · Place ice or a cool, wet towel on the sores 3 times a day. Do this for 20 minutes each time. It may help to reduce redness and swelling. · If you are just getting a cold sore, try over-the-counter docosanol (Abreva) cream to reduce symptoms. · If your doctor prescribed antiviral medicine to relieve pain and help prevent outbreaks, be sure to follow the directions. · Take an over-the-counter pain medicine, such as acetaminophen (Tylenol), ibuprofen (Advil, Motrin), or naproxen (Aleve), as needed. Read and follow all instructions on the label. · Do not take two or more pain medicines at the same time unless the doctor told you to. Many pain medicines have acetaminophen, which is Tylenol. Too much acetaminophen (Tylenol) can be harmful. · Avoid citrus fruit, tomatoes, and other foods that contain acid. · Use over-the-counter ointments to numb sore areas in the mouth or on the lips.  These include Orajel and Anbesol. · Do not kiss or have oral sex with anyone while you have a cold sore. To prevent cold sores in the future  · Avoid long exposure of your lips to the sun. (Wear a hat to help shade your mouth.)  · Do not kiss or have oral sex with someone who has a cold sore. Do not have sex or oral sex with someone who has a genital herpes outbreak. · Using lip balm that contains sunscreen may help reduce outbreaks of cold sores. · Do not share towels, razors, silverware, toothbrushes, or other objects with a person who has a cold sore. When should you call for help? Call your doctor now or seek immediate medical care if:    · Your symptoms are painful and you want to try antiviral medicine.     · You have signs of infection, such as:  ? Increased pain, swelling, warmth, or redness. ? Red streaks leading from a cold sore. ? Pus draining from a cold sore. ? A fever.     · You have a cold sore and develop eye pain, eye discharge, or any changes in your vision.    Watch closely for changes in your health, and be sure to contact your doctor if:    · The cold sore does not heal in 7 to 10 days.     · You get cold sores often. Where can you learn more? Go to http://nisreen-chetna.info/. Enter N848 in the search box to learn more about \"Cold Sores: Care Instructions. \"  Current as of: September 11, 2018  Content Version: 11.9  © 0631-0745 [employer]. Care instructions adapted under license by [employer] (which disclaims liability or warranty for this information). If you have questions about a medical condition or this instruction, always ask your healthcare professional. [place] [address] any warranty or liability for your use of this information.

## 2019-06-27 NOTE — PROGRESS NOTES
[address] [facility]  Medicine  [address] [place] [address]  [phone]    Rajiv Briones is a 60 y.o. female who presents today for her annual checkup    Annual  LMP:10+ years ago. Menses: post menopausal. . Last pelvic/PAP: 2018- Dr. Jacqui Mcdonald. Last mammogram: - February. Last BMD: Never. Last screening colonoscopy: 2019, per patient. Repeat 10 years. Trying to eat a well balanced diet. She exercises on a daily basis doing exercise classes and has a . Hypothyroidism:  Patient is seen for followup of hypothyroidism. Patient is taking medication as prescribed. Currently taking Levothyroxine 88 mcg  Thyroid ROS: denies fatigue, tremor, weight changes, heat/cold intolerance, bowel/skin changes, palpitations  Last TSH reviewed:   Lab Results   Component Value Date/Time    TSH 3.200 2018 08:45 AM     Cold Sores  She states that she gets cold sores every couple of months. She takes zovirax. She uses it on an as needed bases. This helps with the cold sores. She also takes valacyclovir 2 tabs at first sign of flare and then 2 tabs 12 hours later. This combination has helped control her symptoms. Angioedema  Allergic to shell fish. Has epipen. She has never been hospitalized. She has never had to use her epipen. Immunization History   Administered Date(s) Administered    Influenza Vaccine 10/13/2016   , There is no immunization history for the selected administration types on file for this patient. ,       Current Outpatient Medications   Medication Sig Dispense Refill    acyclovir (ZOVIRAX) 5 % ointment Apply to affected area every 3 hours as needed for cold sore. 5 g 0    EPINEPHrine (EPIPEN) 0.3 mg/0.3 mL injection 0.3 mL by IntraMUSCular route once as needed for Anaphylaxis for up to 1 dose. 2 Syringe 1    levothyroxine (SYNTHROID) 88 mcg tablet Take 1 Tab by mouth Daily (before breakfast).  90 Tab 3    valACYclovir (VALTREX) 1 gram tablet Take 2 tabs at first sign of flare then 2 tabs 12 hours later. 30 Tab 0    varicella-zoster recombinant, PF, (SHINGRIX, PF,) 50 mcg/0.5 mL susr injection 0.5mL by IntraMUSCular route once now and then repeat in 2-6 months 0.5 mL 1    LOPERAMIDE HCL (IMODIUM PO) Take  by mouth.  ascorbic acid (VITAMIN C) 500 mg tablet Take  by mouth.  cholecalciferol (VITAMIN D3) 1,000 unit cap Take  by mouth daily.  biotin 2,500 mcg tab Take  by mouth.  B.infantis-B.ani-B.long-B.bifi 10-15 mg TbEC Take  by mouth.  SUMAtriptan (IMITREX) 50 mg tablet   6    FLAXSEED OIL PO Take  by mouth.  naproxen sodium (ALEVE) 220 mg cap Take  by mouth as needed. Allergies: Amoxicillin; Azithromycin; Fish derived;  Iodine; and Shellfish derived   Social History     Socioeconomic History    Marital status:      Spouse name: Not on file    Number of children: Not on file    Years of education: Not on file    Highest education level: Not on file   Occupational History    Not on file   Social Needs    Financial resource strain: Not on file    Food insecurity:     Worry: Not on file     Inability: Not on file    Transportation needs:     Medical: Not on file     Non-medical: Not on file   Tobacco Use    Smoking status: Never Smoker    Smokeless tobacco: Never Used   Substance and Sexual Activity    Alcohol use: No    Drug use: No    Sexual activity: Yes     Partners: Male     Birth control/protection: None     Comment: Postmenopausal   Lifestyle    Physical activity:     Days per week: Not on file     Minutes per session: Not on file    Stress: Not on file   Relationships    Social connections:     Talks on phone: Not on file     Gets together: Not on file     Attends Yazidism service: Not on file     Active member of club or organization: Not on file     Attends meetings of clubs or organizations: Not on file     Relationship status: Not on file    Intimate partner violence:     Fear of current or ex partner: Not on file     Emotionally abused: Not on file     Physically abused: Not on file     Forced sexual activity: Not on file   Other Topics Concern    Not on file   Social History Narrative    Not on file     No family history on file. Past Medical History:   Diagnosis Date    Thyroid disease     hypothyroid       Review of Systems - History obtained from the patient  Gen: negative for weight loss, fever, night sweats  HEENT: negative for hearing loss, earache, congestion, snoring, sorethroat  CV: negative for chest pain, palpitations, edema  Resp: negative for cough, shortness of breath, wheezing  GI: negative for change in bowel habits, abdominal pain, black or bloody stools  : negative for frequency, dysuria, hematuria, vaginal discharge  MSK: negative for back pain, joint pain, muscle pain  Breast: negative for breast lumps, nipple discharge, galactorrhea  Skin :negative for itching, rash, hives  Neuro: negative for dizziness, headache, confusion, weakness  Psych: negative for anxiety, depression, change in mood  Heme/lymph: negative for bleeding, bruising, pallor    Physical Exam    Visit Vitals  /73   Pulse 68   Temp 98.6 °F (37 °C) (Oral)   Resp 16   Ht 5' 2\" (1.575 m)   Wt 135 lb (61.2 kg)   LMP 09/20/2007   SpO2 99%   BMI 24.69 kg/m²     Gen:  Well developed, well nourished female in no acute distress  HEENT: normocephalic/atraumatic; PERRL; TM intact, translucent, and neutral BL;  oropharynx shows no erythema or exudates  Skin:  No rashes or suspicious skin lesions noted  Neck:   Supple, no lympadenopathy, no thyromegaly  Card:  RRR, no m/r/g  Chest:  CTAB, no w/r/r  Abd:  BS+, Soft, nontender/nondistended  Extr:  2+ pulses BL, no LE edema   MS:   full ROM, 5/5 strength BL, sensation intact  Neuro: AAO X 3, CN II-XII grossly intact  Psych:  Nl mood and affect  Pelvic: declined- see's OB/GYN      Assessment/plan    ICD-10-CM ICD-9-CM    1.  Annual physical exam M31.94 D08.3 METABOLIC PANEL, BASIC      varicella-zoster recombinant, PF, (SHINGRIX, PF,) 50 mcg/0.5 mL susr injection      DISCONTINUED: varicella-zoster recombinant, PF, (SHINGRIX, PF,) 50 mcg/0.5 mL susr injection   2. Acquired hypothyroidism E03.9 244.9 TSH 3RD GENERATION      levothyroxine (SYNTHROID) 88 mcg tablet      DISCONTINUED: levothyroxine (SYNTHROID) 88 mcg tablet   3. Recurrent cold sores B00.1 054.9 acyclovir (ZOVIRAX) 5 % ointment      valACYclovir (VALTREX) 1 gram tablet      DISCONTINUED: valACYclovir (VALTREX) 1 gram tablet      DISCONTINUED: acyclovir (ZOVIRAX) 5 % ointment   4. Anaphylaxis, sequela T78. 2XXS 909.9 EPINEPHrine (EPIPEN) 0.3 mg/0.3 mL injection      DISCONTINUED: EPINEPHrine (EPIPEN) 0.3 mg/0.3 mL injection       Annual  60 y.o. female who presents today for annual exam. UTD on screening. See's OB/GYN. UTD on vaccines. Shingrix vaccine provided to patient today. Will check routine labs. Encouraged daily exercise and trying to eat a well balanced diet. Hypothyroidism  Stable. No changes at this time. Last TSH checked in 2018. Continue current dose of Levothyroxine 88 mcg. Discussed importance of taking medication 30 minutes before all other medications. Will adjust medication as needed pending lab results. Cold sores  Recurrent cold sores. Controlled on Zovirax and valacyclovir as needed. Meds refilled. Anaphylaxis. Allergic to shell fish. Gets angioedema. Epipen refilled. Discussed that if she needs to use epipen that she needs to go to the ED. I have discussed the diagnosis with the patient and the intended plan as seen in the above orders. The patient has received an after-visit summary and questions were answered concerning future plans. I have discussed medication side effects and warnings with the patient as well. The patient agrees and understands above plan. Follow-up and Dispositions    · Return in about 1 year (around 6/27/2020).          Patient discussed with supervising attending.      Kathy Bean DO

## 2019-06-28 LAB
BUN SERPL-MCNC: 15 MG/DL (ref 8–27)
BUN/CREAT SERPL: 20 (ref 12–28)
CALCIUM SERPL-MCNC: 9.7 MG/DL (ref 8.7–10.3)
CHLORIDE SERPL-SCNC: 102 MMOL/L (ref 96–106)
CO2 SERPL-SCNC: 26 MMOL/L (ref 20–29)
CREAT SERPL-MCNC: 0.76 MG/DL (ref 0.57–1)
GLUCOSE SERPL-MCNC: 77 MG/DL (ref 65–99)
POTASSIUM SERPL-SCNC: 4.5 MMOL/L (ref 3.5–5.2)
SODIUM SERPL-SCNC: 140 MMOL/L (ref 134–144)
TSH SERPL DL<=0.005 MIU/L-ACNC: 4.42 UIU/ML (ref 0.45–4.5)

## 2019-07-29 DIAGNOSIS — B00.1 RECURRENT COLD SORES: ICD-10-CM

## 2019-07-29 RX ORDER — VALACYCLOVIR HYDROCHLORIDE 1 G/1
TABLET, FILM COATED ORAL
Qty: 30 TAB | Refills: 0 | Status: SHIPPED | OUTPATIENT
Start: 2019-07-29 | End: 2022-04-01

## 2020-06-15 ENCOUNTER — TELEPHONE (OUTPATIENT)
Dept: FAMILY MEDICINE CLINIC | Age: 65
End: 2020-06-15

## 2020-06-15 NOTE — TELEPHONE ENCOUNTER
----- Message from [employer] sent at 6/15/2020 12:53 PM EDT -----  Regarding: Dr. Blayne Kent first and last name and relationship to patient (if not the patient): PT  Best contact number: [phone]  Preferred date and time: After 06/27/2020  Scheduled appointment date and time: N/A  Reason for appointment: CPE needed to have a refill on medication   Details to clarify the request:

## 2020-06-16 NOTE — TELEPHONE ENCOUNTER
LVM to call back to sched telemed/virtual visit for med refills-[facility] 6.16.2020    Close enc

## 2020-06-18 ENCOUNTER — VIRTUAL VISIT (OUTPATIENT)
Dept: FAMILY MEDICINE CLINIC | Age: 65
End: 2020-06-18

## 2020-06-18 DIAGNOSIS — E03.9 ACQUIRED HYPOTHYROIDISM: Primary | ICD-10-CM

## 2020-06-18 RX ORDER — LEVOTHYROXINE SODIUM 88 UG/1
88 TABLET ORAL
Qty: 90 TAB | Refills: 0 | Status: SHIPPED | OUTPATIENT
Start: 2020-06-18 | End: 2020-09-27

## 2020-06-18 NOTE — PROGRESS NOTES
Romie Fleischer  63 y.o. female  1955  [id number] [place] [address]  [id number]    [phone] (home) [phone] (work)     [facility] [address]:    Telephone Encounter  Krysten Mejía MD       Encounter Date: 6/18/2020 at 11:07 AM    Consent: Romie Fleischer, who was seen by synchronous (real-time) audio only technology, and/or her healthcare decision maker, is aware that this patient-initiated, Telehealth encounter on 6/18/2020 is a billable service, with coverage as determined by her insurance carrier. She is aware that she may receive a bill and has provided verbal consent to proceed: Yes. No chief complaint on file. History of Present Illness   Romie Fleischer is a 63 y.o. female was evaluated by telephone. I communicated with the patient and/or health care decision maker about Hypothyroidism. Hypothyroidism  - TSH 6/2019, 6/2018, 4/2017 all wnl, w/o changes in synthroid dosage  - Denies cold/heat intolerance, Diarrhea or constipation, fatigue, CP, SOB, muscle weakness, and weight gain  - Compliant with 88 mcg of synthroid, taking appropriately      Review of Systems   Review of Systems   Constitutional: Negative for malaise/fatigue and weight loss. Cardiovascular: Negative for palpitations. Gastrointestinal: Negative for constipation, diarrhea, nausea and vomiting. Musculoskeletal: Negative for myalgias. Neurological: Negative for weakness. Psychiatric/Behavioral: The patient is not nervous/anxious. Vitals/Objective:   General: Patient speaking in complete sentences without effort. Normal speech and cooperative. Due to this being a Virtual Check-in/Telephone evaluation, many elements of the physical examination are unable to be assessed.     Assessment and Plan:   Time-based coding, delete if not needed: I spent at least 15 minutes with this established patient, and >50% of the time was spent counseling and/or coordinating care regarding Hypothyroidism  Total Time: minutes: 11-20 minutes      1. Acquired hypothyroidism: Patient is due for repeat TSH. Her hypothyroidism has been well controlled last several years with synthroid 88 mcg, TSH's > 3 years wnl. Patient is asymptomatic w/ no concerns. D/t patient's concern for COVID exposure and well controlled hypothyroidism, will refill synthroid today for 90 days and postpone TSH testing for 3 months. - levothyroxine (SYNTHROID) 88 mcg tablet; Take 1 Tab by mouth Daily (before breakfast). Dispense: 90 Tab; Refill: 0  - Patient instructed to schedule in clinic visit in about 3 months after COVID crisis for TSH testing and medication refill        We discussed the expected course, resolution and complications of the diagnosis(es) in detail. Medication risks, benefits, costs, interactions, and alternatives were discussed as indicated. I advised her to contact the office if her condition worsens, changes or fails to improve as anticipated. She expressed understanding with the diagnosis(es) and plan. Patient understands that this encounter was a temporary measure, and the importance of further follow up and examination was emphasized. Patient verbalized understanding. Patient informed to follow up: 3 months for Hypothyroidism and TSH labwork    I affirm this is a Patient Initiated Episode with an Established Patient who has not had a related appointment within my department in the past 7 days or scheduled within the next 24 hours. Note: not billable if this call serves to triage the patient into an appointment for the relevant concern      Electronically Signed: Jr Shepherd MD  Providers location when delivering service: Home        ICD-10-CM ICD-9-CM    1.  Acquired hypothyroidism E03.9 244.9 levothyroxine (SYNTHROID) 88 mcg tablet       Pursuant to the emergency declaration under the [facility] waiver authority and the Coronavirus Preparedness and Response Supplemental Appropriations Act, this Virtual  Visit was conducted, with patient's consent, to reduce the patient's risk of exposure to COVID-19 and provide continuity of care for an established patient. History   Patients past medical, surgical and family histories were personally reviewed and updated. Past Medical History:   Diagnosis Date    Thyroid disease     hypothyroid     Past Surgical History:   Procedure Laterality Date    HX APPENDECTOMY      HX BUNIONECTOMY      HX GYN      BTL    IN EMBOLIZATION UTERINE FIBROID       No family history on file.   Social History     Socioeconomic History    Marital status:      Spouse name: Not on file    Number of children: Not on file    Years of education: Not on file    Highest education level: Not on file   Occupational History    Not on file   Social Needs    Financial resource strain: Not on file    Food insecurity     Worry: Not on file     Inability: Not on file    Transportation needs     Medical: Not on file     Non-medical: Not on file   Tobacco Use    Smoking status: Never Smoker    Smokeless tobacco: Never Used   Substance and Sexual Activity    Alcohol use: No    Drug use: No    Sexual activity: Yes     Partners: Male     Birth control/protection: None     Comment: Postmenopausal   Lifestyle    Physical activity     Days per week: Not on file     Minutes per session: Not on file    Stress: Not on file   Relationships    Social connections     Talks on phone: Not on file     Gets together: Not on file     Attends Judaism service: Not on file     Active member of club or organization: Not on file     Attends meetings of clubs or organizations: Not on file     Relationship status: Not on file    Intimate partner violence     Fear of current or ex partner: Not on file     Emotionally abused: Not on file     Physically abused: Not on file     Forced sexual activity: Not on file   Other Topics Concern    Not on file   Social History Narrative    Not on file            Current Medications/Allergies   Medications and Allergies reviewed:    Current Outpatient Medications   Medication Sig Dispense Refill    levothyroxine (SYNTHROID) 88 mcg tablet Take 1 Tab by mouth Daily (before breakfast). 90 Tab 0    valACYclovir (VALTREX) 1 gram tablet TAKE 2 TABLETS BY MOUTH AT FIRST SIGN OF FLARE, THEN 2 TABLETS 12 HOURS LATER 30 Tab 0    acyclovir (ZOVIRAX) 5 % ointment Apply to affected area every 3 hours as needed for cold sore. 5 g 0    varicella-zoster recombinant, PF, (SHINGRIX, PF,) 50 mcg/0.5 mL susr injection 0.5mL by IntraMUSCular route once now and then repeat in 2-6 months 0.5 mL 1    LOPERAMIDE HCL (IMODIUM PO) Take  by mouth.  ascorbic acid (VITAMIN C) 500 mg tablet Take  by mouth.  cholecalciferol (VITAMIN D3) 1,000 unit cap Take  by mouth daily.  biotin 2,500 mcg tab Take  by mouth.  B.infantis-B.ani-B.long-B.bifi 10-15 mg TbEC Take  by mouth.  SUMAtriptan (IMITREX) 50 mg tablet   6    FLAXSEED OIL PO Take  by mouth.  naproxen sodium (ALEVE) 220 mg cap Take  by mouth as needed.        Allergies   Allergen Reactions    Amoxicillin Other (comments)     Augment gave her diarrhea bad    Azithromycin Itching    Fish Derived Swelling     angioedema    Iodine Swelling    Shellfish Derived Other (comments)     Causes angioedema

## 2020-06-19 NOTE — PROGRESS NOTES
[address] Medicine Residency Attending Addendum:  Dr. Dayna Magallanes MD,  the patient and I were not physically present during this encounter. The resident and I are concurrently monitoring the patient care through appropriate telecommunication technology. I discussed the findings, assessment and plan with the resident and agree with the resident's findings and plan as documented in the resident's note.       Navin Christina MD

## 2020-09-25 DIAGNOSIS — E03.9 ACQUIRED HYPOTHYROIDISM: ICD-10-CM

## 2020-09-27 RX ORDER — LEVOTHYROXINE SODIUM 88 UG/1
TABLET ORAL
Qty: 90 TAB | Refills: 0 | Status: SHIPPED | OUTPATIENT
Start: 2020-09-27 | End: 2020-12-12

## 2020-12-09 DIAGNOSIS — E03.9 ACQUIRED HYPOTHYROIDISM: ICD-10-CM

## 2020-12-12 RX ORDER — LEVOTHYROXINE SODIUM 88 UG/1
TABLET ORAL
Qty: 90 TAB | Refills: 0 | Status: SHIPPED | OUTPATIENT
Start: 2020-12-12 | End: 2021-03-12

## 2020-12-22 DIAGNOSIS — E03.9 HYPOTHYROIDISM, UNSPECIFIED TYPE: Primary | ICD-10-CM

## 2021-03-04 DIAGNOSIS — E03.9 ACQUIRED HYPOTHYROIDISM: ICD-10-CM

## 2021-03-12 ENCOUNTER — TELEPHONE (OUTPATIENT)
Dept: FAMILY MEDICINE CLINIC | Age: 66
End: 2021-03-12

## 2021-03-12 RX ORDER — LEVOTHYROXINE SODIUM 88 UG/1
TABLET ORAL
Qty: 90 TAB | Refills: 0 | Status: SHIPPED | OUTPATIENT
Start: 2021-03-12 | End: 2021-03-26 | Stop reason: DRUGHIGH

## 2021-03-12 NOTE — TELEPHONE ENCOUNTER
Left voice mail to check with pharmacy and to schedule a virtual appt. Dr. Shannon Millard  Received: 3 days ago  Message Contents   Milla [address] Message/Vendor Calls     Caller's first and last name: n/a     Reason for call: why can't Rx be filled     Callback required yes/no and why: yes     Best contact number(s): [phone]     Details to clarify the request: John Paul Goldberg is requesting a return call to discuss why the pharmacy [facility] is unable to fill her Rx, Labs needed? Appt needed?      Maximiliano Woodruff

## 2021-03-15 ENCOUNTER — VIRTUAL VISIT (OUTPATIENT)
Dept: FAMILY MEDICINE CLINIC | Age: 66
End: 2021-03-15
Payer: COMMERCIAL

## 2021-03-15 DIAGNOSIS — E78.5 HYPERLIPIDEMIA, UNSPECIFIED HYPERLIPIDEMIA TYPE: ICD-10-CM

## 2021-03-15 DIAGNOSIS — E03.9 ACQUIRED HYPOTHYROIDISM: Primary | ICD-10-CM

## 2021-03-15 DIAGNOSIS — K57.90 DIVERTICULOSIS: ICD-10-CM

## 2021-03-15 PROCEDURE — 99214 OFFICE O/P EST MOD 30 MIN: CPT | Performed by: STUDENT IN AN ORGANIZED HEALTH CARE EDUCATION/TRAINING PROGRAM

## 2021-03-15 NOTE — PROGRESS NOTES
James Boswell  63 y.o. female  1955  [address] [phone]   [facility] [address]:    Telemedicine Progress Note  Nicci Hoover MD       Encounter Date and Time: March 15, 2021 at 2:48 PM    Consent:  She and/or the health care decision maker is aware that that she may receive a bill for this telephone service, depending on her insurance coverage, and has provided verbal consent to proceed: Yes    Chief Complaint   Patient presents with    Thyroid Problem     History of Present Illness   James Boswell is a 63 y.o. female was evaluated by synchronous (real-time) audio-video technology from home, through [employer]. me:    1. Hypothyroidism. Never missed any pills. Feel fine. She exercises almost every day- swim and walk. 2. Diverticulosis. Dx Colonoscopy 1 year. [facility]. Has been occassional LLQ pain. Review of Systems   ROS    Vitals/Objective:     General: alert, cooperative, no distress   Mental  status: mental status: alert, oriented to person, place, and time, normal mood, behavior, speech, dress, motor activity, and thought processes   Resp: resp: normal effort and no respiratory distress   Neuro: neuro: no gross deficits   Skin: skin: no discoloration or lesions of concern on visible areas   Due to this being a TeleHealth evaluation, many elements of the physical examination are unable to be assessed. Assessment and Plan:   Time spent in direct conversation with the patient to include medical condition(s) discussed, assessment and treatment plan:    1. Acquired hypothyroidism  Lab Results   Component Value Date/Time    TSH 4.420 06/27/2019 02:36 PM     Last TSH ~2yrs ago, normal. Has been on Synthroid 88mcg and stable stymptoms. Will recheck labs. - TSH 3RD GENERATION; Future  - METABOLIC PANEL, COMPREHENSIVE; Future  - CBC W/O DIFF; Future      2. Hyperlipidemia, unspecified hyperlipidemia type  Discussed diet. Not on any meds. Recheck lipid panel.  - LIPID PANEL; Future    3. Diverticulosis  Found on colonoscopy 2 years ago. Occasionally has LLQ, none currently. Discussed increase in fiber intake, either through diet or supplement such as metamucil. Discussed the difference w/ diverticulitis and symptoms. We discussed the expected course, resolution and complications of the diagnosis(es) in detail. Medication risks, benefits, costs, interactions, and alternatives were discussed as indicated. I advised her to contact the office if her condition worsens, changes or fails to improve as anticipated. She expressed understanding with the diagnosis(es) and plan. Patient understands that this encounter was a temporary measure, and the importance of further follow up and examination was emphasized. Patient verbalized understanding. Patient informed to follow up: labs. Electronically Signed: Natalia Murillo MD    Providers location when delivering service (clinic, hospital, home): home    CPT Codes 56557-46785 for Established Patients may apply to this Telehealth Visit. POS code: 18. Modifier GT      Pursuant to the emergency declaration under the [facility] waiver authority and the [employer] Act, this Virtual  Visit was conducted, with patient's consent, to reduce the patient's risk of exposure to COVID-19 and provide continuity of care for an established patient. Services were provided through a video synchronous discussion virtually to substitute for in-person clinic visit. History   Patients past medical, surgical and family histories were reviewed and updated.       Past Medical History:   Diagnosis Date    Thyroid disease     hypothyroid     Past Surgical History:   Procedure Laterality Date    HX APPENDECTOMY      HX BUNIONECTOMY      HX GYN      BTL    [place] EMBOLIZATION UTERINE FIBROID       No family history on file.  Social History     Socioeconomic History    Marital status:      Spouse name: Not on file    Number of children: Not on file    Years of education: Not on file    Highest education level: Not on file   Occupational History    Not on file   Social Needs    Financial resource strain: Not on file    Food insecurity     Worry: Not on file     Inability: Not on file    Transportation needs     Medical: Not on file     Non-medical: Not on file   Tobacco Use    Smoking status: Never Smoker    Smokeless tobacco: Never Used   Substance and Sexual Activity    Alcohol use: No    Drug use: No    Sexual activity: Yes     Partners: Male     Birth control/protection: None     Comment: Postmenopausal   Lifestyle    Physical activity     Days per week: Not on file     Minutes per session: Not on file    Stress: Not on file   Relationships    Social connections     Talks on phone: Not on file     Gets together: Not on file     Attends Religion service: Not on file     Active member of club or organization: Not on file     Attends meetings of clubs or organizations: Not on file     Relationship status: Not on file    Intimate partner violence     Fear of current or ex partner: Not on file     Emotionally abused: Not on file     Physically abused: Not on file     Forced sexual activity: Not on file   Other Topics Concern    Not on file   Social History Narrative    Not on file     Patient Active Problem List   Diagnosis Code    Hypothyroidism E03.9    Menopause Z78.0    Recurrent cold sores B00.1          Current Medications/Allergies   Medications and Allergies reviewed:    Current Outpatient Medications   Medication Sig Dispense Refill    levothyroxine (SYNTHROID) 88 mcg tablet TAKE 1 TABLET BY MOUTH EVERY DAY BEFORE BREAKFAST 90 Tab 0    valACYclovir (VALTREX) 1 gram tablet TAKE 2 TABLETS BY MOUTH AT FIRST SIGN OF FLARE, THEN 2 TABLETS 12 HOURS LATER 30 Tab 0    acyclovir (ZOVIRAX) 5 % ointment Apply to affected area every 3 hours as needed for cold sore. 5 g 0    varicella-zoster recombinant, PF, (SHINGRIX, PF,) 50 mcg/0.5 mL susr injection 0.5mL by IntraMUSCular route once now and then repeat in 2-6 months 0.5 mL 1    LOPERAMIDE HCL (IMODIUM PO) Take  by mouth.  ascorbic acid (VITAMIN C) 500 mg tablet Take  by mouth.  cholecalciferol (VITAMIN D3) 1,000 unit cap Take  by mouth daily.  biotin 2,500 mcg tab Take  by mouth.  B.infantis-B.ani-B.long-B.bifi 10-15 mg TbEC Take  by mouth.  SUMAtriptan (IMITREX) 50 mg tablet   6    FLAXSEED OIL PO Take  by mouth.  naproxen sodium (ALEVE) 220 mg cap Take  by mouth as needed.        Allergies   Allergen Reactions    Amoxicillin Other (comments)     Augment gave her diarrhea bad    Azithromycin Itching    Fish Derived Swelling     angioedema    Iodine Swelling    Shellfish Derived Other (comments)     Causes angioedema

## 2021-03-25 ENCOUNTER — LAB ONLY (OUTPATIENT)
Dept: FAMILY MEDICINE CLINIC | Age: 66
End: 2021-03-25

## 2021-03-26 ENCOUNTER — PATIENT MESSAGE (OUTPATIENT)
Dept: FAMILY MEDICINE CLINIC | Age: 66
End: 2021-03-26

## 2021-03-26 DIAGNOSIS — E03.9 ACQUIRED HYPOTHYROIDISM: Primary | ICD-10-CM

## 2021-03-26 LAB
ALBUMIN SERPL-MCNC: 4.4 G/DL (ref 3.8–4.8)
ALBUMIN/GLOB SERPL: 2 {RATIO} (ref 1.2–2.2)
ALP SERPL-CCNC: 70 IU/L (ref 39–117)
ALT SERPL-CCNC: 17 IU/L (ref 0–32)
AST SERPL-CCNC: 17 IU/L (ref 0–40)
BILIRUB SERPL-MCNC: 0.7 MG/DL (ref 0–1.2)
BUN SERPL-MCNC: 16 MG/DL (ref 8–27)
BUN/CREAT SERPL: 18 (ref 12–28)
CALCIUM SERPL-MCNC: 9.6 MG/DL (ref 8.7–10.3)
CHLORIDE SERPL-SCNC: 103 MMOL/L (ref 96–106)
CHOLEST SERPL-MCNC: 212 MG/DL (ref 100–199)
CO2 SERPL-SCNC: 25 MMOL/L (ref 20–29)
CREAT SERPL-MCNC: 0.88 MG/DL (ref 0.57–1)
ERYTHROCYTE [DISTWIDTH] IN BLOOD BY AUTOMATED COUNT: 12.9 % (ref 11.7–15.4)
GLOBULIN SER CALC-MCNC: 2.2 G/DL (ref 1.5–4.5)
GLUCOSE SERPL-MCNC: 84 MG/DL (ref 65–99)
HCT VFR BLD AUTO: 43.7 % (ref 34–46.6)
HDLC SERPL-MCNC: 66 MG/DL
HGB BLD-MCNC: 14.2 G/DL (ref 11.1–15.9)
IMP & REVIEW OF LAB RESULTS: NORMAL
LDLC SERPL CALC-MCNC: 123 MG/DL (ref 0–99)
MCH RBC QN AUTO: 30.5 PG (ref 26.6–33)
MCHC RBC AUTO-ENTMCNC: 32.5 G/DL (ref 31.5–35.7)
MCV RBC AUTO: 94 FL (ref 79–97)
PLATELET # BLD AUTO: 252 X10E3/UL (ref 150–450)
POTASSIUM SERPL-SCNC: 4.3 MMOL/L (ref 3.5–5.2)
PROT SERPL-MCNC: 6.6 G/DL (ref 6–8.5)
RBC # BLD AUTO: 4.66 X10E6/UL (ref 3.77–5.28)
SODIUM SERPL-SCNC: 141 MMOL/L (ref 134–144)
TRIGL SERPL-MCNC: 133 MG/DL (ref 0–149)
TSH SERPL DL<=0.005 MIU/L-ACNC: 5.78 UIU/ML (ref 0.45–4.5)
VLDLC SERPL CALC-MCNC: 23 MG/DL (ref 5–40)
WBC # BLD AUTO: 6.1 X10E3/UL (ref 3.4–10.8)

## 2021-03-26 RX ORDER — LEVOTHYROXINE SODIUM 100 UG/1
100 TABLET ORAL
Qty: 60 TAB | Refills: 0 | Status: SHIPPED | OUTPATIENT
Start: 2021-03-26 | End: 2021-05-14 | Stop reason: SDUPTHER

## 2021-04-20 ENCOUNTER — TELEPHONE (OUTPATIENT)
Dept: FAMILY MEDICINE CLINIC | Age: 66
End: 2021-04-20

## 2021-04-20 ENCOUNTER — OFFICE VISIT (OUTPATIENT)
Dept: FAMILY MEDICINE CLINIC | Age: 66
End: 2021-04-20
Payer: COMMERCIAL

## 2021-04-20 VITALS
WEIGHT: 133.4 LBS | DIASTOLIC BLOOD PRESSURE: 70 MMHG | BODY MASS INDEX: 24.55 KG/M2 | HEIGHT: 62 IN | HEART RATE: 77 BPM | OXYGEN SATURATION: 95 % | SYSTOLIC BLOOD PRESSURE: 103 MMHG | TEMPERATURE: 98.6 F | RESPIRATION RATE: 16 BRPM

## 2021-04-20 DIAGNOSIS — N39.0 URINARY TRACT INFECTION WITH HEMATURIA, SITE UNSPECIFIED: Primary | ICD-10-CM

## 2021-04-20 DIAGNOSIS — R31.9 URINARY TRACT INFECTION WITH HEMATURIA, SITE UNSPECIFIED: Primary | ICD-10-CM

## 2021-04-20 DIAGNOSIS — R35.0 URINARY FREQUENCY: ICD-10-CM

## 2021-04-20 LAB
BILIRUB UR QL STRIP: NEGATIVE
GLUCOSE UR-MCNC: NORMAL MG/DL
KETONES P FAST UR STRIP-MCNC: NEGATIVE MG/DL
PH UR STRIP: 6 [PH] (ref 4.6–8)
PROT UR QL STRIP: NORMAL
SP GR UR STRIP: 1.01 (ref 1–1.03)
UA UROBILINOGEN AMB POC: NORMAL (ref 0.2–1)
URINALYSIS CLARITY POC: NORMAL
URINALYSIS COLOR POC: YELLOW
URINE BLOOD POC: NORMAL
URINE LEUKOCYTES POC: NORMAL
URINE NITRITES POC: POSITIVE

## 2021-04-20 PROCEDURE — 99214 OFFICE O/P EST MOD 30 MIN: CPT | Performed by: FAMILY MEDICINE

## 2021-04-20 PROCEDURE — 81003 URINALYSIS AUTO W/O SCOPE: CPT | Performed by: FAMILY MEDICINE

## 2021-04-20 RX ORDER — NITROFURANTOIN 25; 75 MG/1; MG/1
100 CAPSULE ORAL 2 TIMES DAILY
Qty: 10 CAP | Refills: 0 | Status: SHIPPED | OUTPATIENT
Start: 2021-04-20 | End: 2021-04-25

## 2021-04-20 RX ORDER — PHENAZOPYRIDINE HYDROCHLORIDE 200 MG/1
TABLET, FILM COATED ORAL
COMMUNITY

## 2021-04-20 RX ORDER — CIPROFLOXACIN 250 MG/1
TABLET, FILM COATED ORAL EVERY 12 HOURS
COMMUNITY
End: 2021-04-20 | Stop reason: DRUGHIGH

## 2021-04-20 NOTE — PROGRESS NOTES
Dorita Meza is a 63 y.o. female    Chief Complaint   Patient presents with    Urinary Frequency     Patient is coming in for possible UTI since sunday. Leighton ehas been getting UTI for years. She states that she has alot of urgency to urinate and pelvic pain. a doctor put her on ciprofloxacin 250 mg and phenazopyridlne 665 mg. No other cocnerns. 1. Have you been to the ER, urgent care clinic since your last visit? Hospitalized since your last visit? No  M  2. Have you seen or consulted any other health care providers outside of the [address] [place] [address] since your last visit? Include any pap smears or colon screening. No      Visit Vitals  /70 (BP 1 Location: Right upper arm, BP Patient Position: Sitting)   Pulse 77   Temp 98.6 °F (37 °C) (Temporal)   Resp 16   Ht 5' 2\" (1.575 m)   Wt 133 lb 6.4 oz (60.5 kg)   SpO2 95%   BMI 24.40 kg/m²           Health Maintenance Due   Topic Date Due    COVID-19 Vaccine (1) Never done    Shingrix Vaccine Age 50> (1 of 2) Never done    PAP AKA CERVICAL CYTOLOGY  04/24/2020         Medication Reconciliation completed, changes noted.   Please  Update medication list.

## 2021-04-20 NOTE — TELEPHONE ENCOUNTER
Pt calling with complaints of a Possible UTI nurse triage per alessia and nurse franklin anguiano in office . Pt appt scheduled .

## 2021-04-21 NOTE — PROGRESS NOTES
David Fernandez is a 63 y.o. female here today to address the following issues:  Chief Complaint   Patient presents with    Urinary Frequency     Patient is coming in for possible UTI since sunday. She has been getting UTI for years. She states that she has alot of urgency to urinate and pelvic pain. a doctor put her on ciprofloxacin 250 mg and phenazopyridlne 829 mg. No other cocnerns. Onset:4 days  Symptoms: urgency  Alleviating Factors: little improvement with cipro but not sure  Recent Use of Antibiotics: on cipro now for UTI from [employer] online service.  States she was on this for prophylaxis in the past.      Past Medical History:   Diagnosis Date    Thyroid disease     hypothyroid     Past Surgical History:   Procedure Laterality Date    HX APPENDECTOMY      HX BUNIONECTOMY      HX GYN      BTL    CT EMBOLIZATION UTERINE FIBROID       Social History     Socioeconomic History    Marital status:      Spouse name: Not on file    Number of children: Not on file    Years of education: Not on file    Highest education level: Not on file   Occupational History    Not on file   Social Needs    Financial resource strain: Not on file    Food insecurity     Worry: Not on file     Inability: Not on file    Transportation needs     Medical: Not on file     Non-medical: Not on file   Tobacco Use    Smoking status: Never Smoker    Smokeless tobacco: Never Used   Substance and Sexual Activity    Alcohol use: No    Drug use: No    Sexual activity: Yes     Partners: Male     Birth control/protection: None     Comment: Postmenopausal   Lifestyle    Physical activity     Days per week: Not on file     Minutes per session: Not on file    Stress: Not on file   Relationships    Social connections     Talks on phone: Not on file     Gets together: Not on file     Attends Christian service: Not on file     Active member of club or organization: Not on file     Attends meetings of clubs or organizations: Not on file     Relationship status: Not on file    Intimate partner violence     Fear of current or ex partner: Not on file     Emotionally abused: Not on file     Physically abused: Not on file     Forced sexual activity: Not on file   Other Topics Concern    Not on file   Social History Narrative    Not on file       Allergies   Allergen Reactions    Amoxicillin Other (comments)     Augment gave her diarrhea bad    Azithromycin Itching    Fish Derived Swelling     angioedema    Iodine Swelling    Shellfish Derived Other (comments)     Causes angioedema       Current Outpatient Medications   Medication Sig    phenazopyridine (PYRIDIUM) 200 mg tablet Take  by mouth three (3) times daily as needed for Pain.  nitrofurantoin, macrocrystal-monohydrate, (Macrobid) 100 mg capsule Take 1 Cap by mouth two (2) times a day for 5 days.  levothyroxine (SYNTHROID) 100 mcg tablet Take 1 Tab by mouth Daily (before breakfast).  varicella-zoster recombinant, PF, (SHINGRIX, PF,) 50 mcg/0.5 mL susr injection 0.5mL by IntraMUSCular route once now and then repeat in 2-6 months    valACYclovir (VALTREX) 1 gram tablet TAKE 2 TABLETS BY MOUTH AT FIRST SIGN OF FLARE, THEN 2 TABLETS 12 HOURS LATER    acyclovir (ZOVIRAX) 5 % ointment Apply to affected area every 3 hours as needed for cold sore.  LOPERAMIDE HCL (IMODIUM PO) Take  by mouth.  ascorbic acid (VITAMIN C) 500 mg tablet Take  by mouth.  cholecalciferol (VITAMIN D3) 1,000 unit cap Take  by mouth daily.  biotin 2,500 mcg tab Take  by mouth.  B.infantis-B.ani-B.long-B.bifi 10-15 mg TbEC Take  by mouth.  SUMAtriptan (IMITREX) 50 mg tablet     FLAXSEED OIL PO Take  by mouth.  naproxen sodium (ALEVE) 220 mg cap Take  by mouth as needed. No current facility-administered medications for this visit. ROS  A comprehensive review of systems was negative except for that written in the HPI and listed above.        Visit Vitals  /70 (BP 1 Location: Right upper arm, BP Patient Position: Sitting)   Pulse 77   Temp 98.6 °F (37 °C) (Temporal)   Resp 16   Ht 5' 2\" (1.575 m)   Wt 133 lb 6.4 oz (60.5 kg)   LMP 09/20/2007   SpO2 95%   BMI 24.40 kg/m²       Physical Exam  Vitals signs and nursing note reviewed. Constitutional:       General: She is not in acute distress. Appearance: She is not diaphoretic. Eyes:      General: No scleral icterus. Pulmonary:      Effort: Pulmonary effort is normal.   Abdominal:      Palpations: Abdomen is soft. Tenderness: There is no abdominal tenderness. Skin:     General: Skin is warm. Neurological:      Mental Status: She is alert. Psychiatric:         Mood and Affect: Affect normal.         Recent Results (from the past 12 hour(s))   AMB POC URINALYSIS DIP STICK AUTO W/O MICRO    Collection Time: 04/20/21  4:46 PM   Result Value Ref Range    Color (UA POC) Yellow     Clarity (UA POC) Cloudy     Glucose (UA POC) Trace Negative    Bilirubin (UA POC) Negative Negative    Ketones (UA POC) Negative Negative    Specific gravity (UA POC) 1.015 1.001 - 1.035    Blood (UA POC) Trace Negative    pH (UA POC) 6.0 4.6 - 8.0    Protein (UA POC) 1+ Negative    Urobilinogen (UA POC) 1 mg/dL 0.2 - 1    Nitrites (UA POC) Positive Negative    Leukocyte esterase (UA POC) 3+ Negative       1. Urinary frequency  - AMB POC URINALYSIS DIP STICK AUTO W/O MICRO    2. Urinary tract infection with hematuria, site unspecified  - CULTURE, URINE; Future  - CULTURE, URINE  - nitrofurantoin, macrocrystal-monohydrate, (Macrobid) 100 mg capsule; Take 1 Cap by mouth two (2) times a day for 5 days. Dispense: 10 Cap; Refill: 0    Send urine for Cx. Can continue cipro another 1-2 days and if she continues to do better, can complete course. Send for UCx. Maybe false negative since already on abx. She can consider switching to macrobid if no improvement or worsening in the next 1-2 days as UCx may not be back by then.     Follow-up and Dispositions    · Return in about 4 weeks (around 5/18/2021) for Dr. Brooklyn Ochoa for thyroid .          Guero Kilgore MD, CAQSM, RMSK

## 2021-04-22 LAB — BACTERIA UR CULT: ABNORMAL

## 2021-05-11 ENCOUNTER — TELEPHONE (OUTPATIENT)
Dept: FAMILY MEDICINE CLINIC | Age: 66
End: 2021-05-11

## 2021-05-11 NOTE — TELEPHONE ENCOUNTER
----- Message from Alen Salcedo sent at 5/11/2021  2:26 PM EDT -----  Regarding: Dr. Tyler Freeman Refill  Medication Refill    Caller (if not patient): Pt      Relationship of caller (if not patient): Self      Best contact number(s): [phone]      Name of medication and dosage if known:   levothyroxine (SYNTHROID) 100 mcg tablet       Is patient out of this medication (yes/no): No; pretty close to it      Pharmacy name: [facility]    Pharmacy listed in chart? (yes/no): Yes  Pharmacy phone number: [phone]      Details to clarify the request: Pt is going out of town and needs a refill. She said she normally receives a 3 month supply and only received a 2 month supply this time.        Alen Salcedo

## 2021-05-12 ENCOUNTER — LAB ONLY (OUTPATIENT)
Dept: FAMILY MEDICINE CLINIC | Age: 66
End: 2021-05-12

## 2021-05-13 LAB — TSH SERPL DL<=0.005 MIU/L-ACNC: 4.2 UIU/ML (ref 0.45–4.5)

## 2021-05-14 DIAGNOSIS — E03.9 ACQUIRED HYPOTHYROIDISM: ICD-10-CM

## 2021-05-14 RX ORDER — LEVOTHYROXINE SODIUM 100 UG/1
100 TABLET ORAL
Qty: 90 TAB | Refills: 3 | Status: SHIPPED | OUTPATIENT
Start: 2021-05-14 | End: 2022-04-01 | Stop reason: SDUPTHER

## 2022-03-18 PROBLEM — B00.1 RECURRENT COLD SORES: Status: ACTIVE | Noted: 2019-06-27

## 2022-03-19 PROBLEM — Z78.0 MENOPAUSE: Status: ACTIVE | Noted: 2018-11-13

## 2022-04-01 ENCOUNTER — OFFICE VISIT (OUTPATIENT)
Dept: FAMILY MEDICINE CLINIC | Age: 67
End: 2022-04-01
Payer: COMMERCIAL

## 2022-04-01 VITALS
OXYGEN SATURATION: 99 % | BODY MASS INDEX: 24.95 KG/M2 | WEIGHT: 135.6 LBS | HEART RATE: 68 BPM | RESPIRATION RATE: 16 BRPM | SYSTOLIC BLOOD PRESSURE: 109 MMHG | DIASTOLIC BLOOD PRESSURE: 71 MMHG | HEIGHT: 62 IN

## 2022-04-01 DIAGNOSIS — Z00.00 WELL WOMAN EXAM WITHOUT GYNECOLOGICAL EXAM: Primary | ICD-10-CM

## 2022-04-01 DIAGNOSIS — G44.229 CHRONIC TENSION-TYPE HEADACHE, NOT INTRACTABLE: ICD-10-CM

## 2022-04-01 DIAGNOSIS — E03.9 ACQUIRED HYPOTHYROIDISM: ICD-10-CM

## 2022-04-01 PROCEDURE — 99214 OFFICE O/P EST MOD 30 MIN: CPT | Performed by: STUDENT IN AN ORGANIZED HEALTH CARE EDUCATION/TRAINING PROGRAM

## 2022-04-01 PROCEDURE — 99396 PREV VISIT EST AGE 40-64: CPT | Performed by: STUDENT IN AN ORGANIZED HEALTH CARE EDUCATION/TRAINING PROGRAM

## 2022-04-01 RX ORDER — KETOROLAC TROMETHAMINE 10 MG/1
TABLET, FILM COATED ORAL
Qty: 30 TABLET | Refills: 3 | Status: SHIPPED | OUTPATIENT
Start: 2022-04-01

## 2022-04-01 RX ORDER — KETOROLAC TROMETHAMINE 10 MG/1
TABLET, FILM COATED ORAL
COMMUNITY
End: 2022-04-01 | Stop reason: SDUPTHER

## 2022-04-01 RX ORDER — KETOROLAC TROMETHAMINE 10 MG/1
TABLET, FILM COATED ORAL
Qty: 30 TABLET | Refills: 3 | Status: SHIPPED | OUTPATIENT
Start: 2022-04-01 | End: 2022-04-01

## 2022-04-01 RX ORDER — LEVOTHYROXINE SODIUM 100 UG/1
100 TABLET ORAL
Qty: 90 TABLET | Refills: 3 | Status: SHIPPED | OUTPATIENT
Start: 2022-04-01

## 2022-04-01 NOTE — PROGRESS NOTES
[address] [address] [place] [address]   Office [phone], Fax [phone]      Chief Complaint:     Chief Complaint   Patient presents with    Complete Physical     Patient is here for a complete physical.  Had her pap smear last year with Dr. Millie Palacios. Leann Crow is a 61 y.o. female that presents for:  Well woman       Assessment/Plan:       1. Well woman exam without gynecological exam  Comments:  BMD scheduled for May 2022   Paps with GYN and up todate   up to date on mammo  Orders:  -     CBC W/O DIFF; Future  -     LIPID PANEL; Future  -     METABOLIC PANEL, COMPREHENSIVE; Future  2. Acquired hypothyroidism  Comments:  stable on synthroid   check labs today   refill meds   Orders:  -     TSH 3RD GENERATION; Future  -     levothyroxine (SYNTHROID) 100 mcg tablet; Take 1 Tablet by mouth Daily (before breakfast). , Normal, Disp-90 Tablet, R-3  3. Chronic tension-type headache, not intractable  Comments:  stable on prn toradol 10mg. only gets HA when she travels which is about once a month   refill med   Orders:  -     ketorolac (TORADOL) 10 mg tablet; ketorolac 10 mg tablet  TAKE 1 TAB BY MOUTH 3 TIMES A DAY AS NEEDED FOR HEADACHE, Normal, Disp-30 Tablet, R-3         Follow up: Follow-up and Dispositions    · Return in about 6 months (around 10/1/2022) for follow up hypothyroid . Subjective:   HPI:  Leann Crow is a 61 y.o. female that presents for: Well Woman Exam  And labs        LMP  Post menopausal   STD history: none    Abnormal pap: none   Cancer history: none   Diet: nothing special.  Cooks a lot. Exercise: daily. MWF exercise class with cardio and weight training. . Swims a few days per week.       Pt denies fever, chills, HA, vision disturbances, chest pain, SOB, N/V/D, Abdominal pain,  urinary problems, vaginal bleeding, foul smelling vaginal discharge, dizziness, n/t/w arms or legs, significant weight changes, hot and cold intolerance, rashes. Hypothyroid: compliant with synthroid. Needs refill and labs today. Tension HA: states they only come on when she travels. Has tried tylenol, motrin and exceedrin and nothing seems to work as well as toradol. Only has to take this once a month or less. Does not have a migraine history. Denies aura, photo/phonophobia, n/v, dizziness vision changes with onset of HA. Health Maintenance:  Health Maintenance Due   Topic Date Due    Shingrix Vaccine Age 49> (2 of 2) 11/25/2019    Bone Densitometry (Dexa) Screening  05/21/2022    has BMD scheduled in May 2022    ROS:   Review of Systems   Constitutional: Negative for chills and fever. HENT: Negative for congestion and sinus pain. Eyes: Negative for double vision. Respiratory: Negative for cough and shortness of breath. Cardiovascular: Negative for chest pain and palpitations. Gastrointestinal: Negative for abdominal pain, nausea and vomiting. Genitourinary: Negative for dysuria and hematuria. Musculoskeletal: Negative for myalgias. Skin: Negative for rash. Neurological: Negative for dizziness and headaches. Past medical history, social history, and medications personally reviewed. Past Medical History:   Diagnosis Date    Thyroid disease     hypothyroid        Allergies personally reviewed. Allergies   Allergen Reactions    Amoxicillin Other (comments)     Augment gave her diarrhea bad    Azithromycin Itching    Fish Derived Swelling     angioedema    Iodine Swelling    Shellfish Derived Other (comments)     Causes angioedema          Objective:   Vitals reviewed.   Visit Vitals  /71 (BP 1 Location: Right arm, BP Patient Position: Sitting, BP Cuff Size: Adult)   Pulse 68   Resp 16   Ht 5' 2\" (1.575 m)   Wt 135 lb 9.6 oz (61.5 kg)   LMP 09/20/2007   SpO2 99%   BMI 24.80 kg/m²            Physical Exam    General appearance - alert, well appearing, and in no distress  Ears - bilateral TM's and external ear canals normal  Nose - normal and patent, no erythema, discharge or polyps  Mouth - mucous membranes moist, pharynx normal without lesions  Neck - supple, no significant adenopathy, thyroid exam: thyroid is normal in size without nodules or tenderness  Chest - clear to auscultation, no wheezes, rales or rhonchi, symmetric air entry  Heart - normal rate, regular rhythm, normal S1, S2, no murmurs, rubs, clicks or gallops  Abdomen - soft, nontender, nondistended, no masses or organomegaly  Neurological - alert, oriented, normal speech, no focal findings or movement disorder noted  Musculoskeletal - no joint tenderness, deformity or swelling  Extremities - no pedal edema noted  Skin - normal coloration and turgor, no rashes, no suspicious skin lesions noted  Pelvic - deferred, done by OGYN   Breast - deferred        Pt was discussed with Dr Mercy Cuellar  (attending physician). I have reviewed pertinent labs results and other data. I have discussed the diagnosis with the patient and the intended plan as seen in the above orders. The patient has received an after-visit summary and questions were answered concerning future plans. I have discussed medication side effects and warnings with the patient as well.     Saba Martinez, DO  Resident [facility]  04/01/22

## 2022-04-01 NOTE — PROGRESS NOTES
Chief Complaint   Patient presents with    Complete Physical     Patient is here for a complete physical.  Had her pap smear last year with Dr. Corinne Lai.      Visit Vitals  /71 (BP 1 Location: Right arm, BP Patient Position: Sitting, BP Cuff Size: Adult)   Pulse 68   Resp 16   Ht 5' 2\" (1.575 m)   Wt 135 lb 9.6 oz (61.5 kg)   SpO2 99%   BMI 24.80 kg/m²

## 2022-04-02 LAB
ALBUMIN SERPL-MCNC: 4.4 G/DL (ref 3.8–4.8)
ALBUMIN/GLOB SERPL: 2.2 {RATIO} (ref 1.2–2.2)
ALP SERPL-CCNC: 78 IU/L (ref 44–121)
ALT SERPL-CCNC: 16 IU/L (ref 0–32)
AST SERPL-CCNC: 20 IU/L (ref 0–40)
BILIRUB SERPL-MCNC: 0.5 MG/DL (ref 0–1.2)
BUN SERPL-MCNC: 13 MG/DL (ref 8–27)
BUN/CREAT SERPL: 14 (ref 12–28)
CALCIUM SERPL-MCNC: 9.3 MG/DL (ref 8.7–10.3)
CHLORIDE SERPL-SCNC: 99 MMOL/L (ref 96–106)
CHOLEST SERPL-MCNC: 216 MG/DL (ref 100–199)
CO2 SERPL-SCNC: 26 MMOL/L (ref 20–29)
CREAT SERPL-MCNC: 0.9 MG/DL (ref 0.57–1)
EGFR: 71 ML/MIN/1.73
ERYTHROCYTE [DISTWIDTH] IN BLOOD BY AUTOMATED COUNT: 12.5 % (ref 11.7–15.4)
GLOBULIN SER CALC-MCNC: 2 G/DL (ref 1.5–4.5)
GLUCOSE SERPL-MCNC: 62 MG/DL (ref 65–99)
HCT VFR BLD AUTO: 43.5 % (ref 34–46.6)
HDLC SERPL-MCNC: 55 MG/DL
HGB BLD-MCNC: 14.7 G/DL (ref 11.1–15.9)
IMP & REVIEW OF LAB RESULTS: NORMAL
LDLC SERPL CALC-MCNC: 124 MG/DL (ref 0–99)
MCH RBC QN AUTO: 31.9 PG (ref 26.6–33)
MCHC RBC AUTO-ENTMCNC: 33.8 G/DL (ref 31.5–35.7)
MCV RBC AUTO: 94 FL (ref 79–97)
PLATELET # BLD AUTO: 251 X10E3/UL (ref 150–450)
POTASSIUM SERPL-SCNC: 3.8 MMOL/L (ref 3.5–5.2)
PROT SERPL-MCNC: 6.4 G/DL (ref 6–8.5)
RBC # BLD AUTO: 4.61 X10E6/UL (ref 3.77–5.28)
SODIUM SERPL-SCNC: 139 MMOL/L (ref 134–144)
TRIGL SERPL-MCNC: 212 MG/DL (ref 0–149)
TSH SERPL DL<=0.005 MIU/L-ACNC: 3.09 UIU/ML (ref 0.45–4.5)
VLDLC SERPL CALC-MCNC: 37 MG/DL (ref 5–40)
WBC # BLD AUTO: 8.4 X10E3/UL (ref 3.4–10.8)

## 2022-04-04 NOTE — PROGRESS NOTES
TSH wnl- stay on synthroid 100mcg   CMP, CBC wnl   Lipid- LDL elevated at 124.   ASCVD risk 3.9%- statin not indicated     [employer] message sent to patient

## 2024-10-26 NOTE — TELEPHONE ENCOUNTER
[facility]  HISTORY AND PHYSICAL      Patient: Camille Palacio Date: 10/26/2024   female, 78 year old  Admit Date: 10/26/2024   Attending: Shar Camacho DO        PRIMARY CARE PROVIDER:  Yamel Grubbs     ATTENDING PHYSICIAN    Shar Camacho DO      CHIEF COMPLAINT    Bright red blood per rectum    HPI    Camille Palacio is a 78 year old female on peritoneal dialysis who noted bright red blood per rectum last evening.  Today she passed a large blood clot.  She did feel slightly dizzy.  Hemoglobin measured at 11.1.  Patient has known diverticulosis and was recently hospitalized where she also had diverticular bleed that resolved on its own.  Patient also suffers with chronic watery diarrhea 10-12 stools daily for the last 4-6 months.  It is so bad she cannot leave her home.  Patient was seen in GI clinic where multiple stool studies were performed and she is scheduled for colonoscopy on 11/15.  Patient states she had a full run of peritoneal dialysis last evening.    Blood pressure 166/75 with heart rate of 100 and patient is afebrile.      Patient was recently hospitalized and diagnosed with a spontaneous bacterial tinnitus culture negative with results of fluid revealing 569 nucleated cells and 254 neutrophils.  She was treated inpatient with ceftazidime and to my knowledge was not sent home on oral antibiotics.    Finally the patient has suffered many losses in the last 1 year including deaths of her , daughter and 2 peripheral family members    PAST MEDICAL HISTORY    No past medical history on file.    SURGICAL HISTORY    No past surgical history on file.    FAMILY HISTORY    Family History   Problem Relation Age of Onset    Diabetes Brother        SOCIAL HISTORY    Social History     Tobacco Use    Smoking status: Every Day     Types: Cigarettes    Smokeless tobacco: Never       CURRENT MEDICATIONS    Outpatient Medications Marked as Taking for the 10/26/24 encounter  Will refill but needs to be seen within the month for thyroid follow and repeat TSH (Hospital Encounter)   Medication Sig Dispense Refill    B Complex-C-Folic Acid Tab Take 1 tablet by mouth daily. Dialyvite      Cholecalciferol (VITAMIN D-3 PO) Take 1 capsule by mouth daily.      bumetanide (BUMEX) 2 MG tablet Take 2 mg by mouth daily.      ferrous sulfate 325 (65 FE) MG tablet Take 325 mg by mouth daily.      omeprazole (PrilOSEC) 20 MG capsule Take 20 mg by mouth daily.      ondansetron (ZOFRAN ODT) 4 MG disintegrating tablet [None received]      pravastatin (PRAVACHOL) 40 MG tablet Take 40 mg by mouth daily.      ropinirole (REQUIP) 4 MG tablet Take 4 mg by mouth nightly.      sevelamer carbonate (RENVELA) 800 MG tablet Take 1 tablet with each meal (3x daily) and 1 with each snack (max 5 tablets daily). 450 tablet 1    metoPROLOL succinate (TOPROL-XL) 50 MG 24 hr tablet Take 50 mg by mouth daily. Indications: High Blood Pressure Disorder      furosemide (LASIX) 80 MG tablet Take 1 tablet by mouth 2 times daily. 180 tablet 1      Medications Prior to Admission   Medication Sig Dispense Refill    B Complex-C-Folic Acid Tab Take 1 tablet by mouth daily. Dialyvite      Cholecalciferol (VITAMIN D-3 PO) Take 1 capsule by mouth daily.      bumetanide (BUMEX) 2 MG tablet Take 2 mg by mouth daily.      ferrous sulfate 325 (65 FE) MG tablet Take 325 mg by mouth daily.      omeprazole (PrilOSEC) 20 MG capsule Take 20 mg by mouth daily.      ondansetron (ZOFRAN ODT) 4 MG disintegrating tablet [None received]      pravastatin (PRAVACHOL) 40 MG tablet Take 40 mg by mouth daily.      ropinirole (REQUIP) 4 MG tablet Take 4 mg by mouth nightly.      metroNIDAZOLE (FLAGYL) 250 MG tablet Take 1 tablet 1-2 hr prior to colonoscopy (Patient not taking: Reported on 10/15/2024) 1 tablet 0    ciprofloxacin (Cipro) 500 MG tablet Take 1 tablet 1-2 hr prior to colonoscopy (Patient not taking: Reported on 10/15/2024) 1 tablet 0    sevelamer carbonate (RENVELA) 800 MG tablet Take 1 tablet with each meal (3x daily) and 1  with each snack (max 5 tablets daily). 450 tablet 1    metoPROLOL succinate (TOPROL-XL) 50 MG 24 hr tablet Take 50 mg by mouth daily. Indications: High Blood Pressure Disorder      calcitRIOL (ROCALTROL) 0.25 MCG capsule Take 1 capsule by mouth daily. (Patient not taking: Reported on 10/26/2024) 90 capsule 1    furosemide (LASIX) 80 MG tablet Take 1 tablet by mouth 2 times daily. 180 tablet 1    midodrine (PROAMATINE) 5 MG tablet Take 5mg (1pill) as needed if systolic blood pressure is less than 90. Max dose of 3 pills daily (Am, Afternoon, PM). 90 tablet 1       ALLERGIES    ALLERGIES:  No Known Allergies    REVIEW OF SYSTEMS    All 13 Review of Systems negative except for what's listed in the HPI.      PHYSICAL EXAM    Vital 24 Hour Range Most Recent Value   Temperature Temp  Min: 97.6 °F (36.4 °C)  Max: 98.1 °F (36.7 °C) 98.1 °F (36.7 °C)   Pulse Pulse  Min: 99  Max: 106 (!) 102   Respiratory Resp  Min: 18  Max: 18 18   Blood Pressure BP  Min: 113/58  Max: 166/75 136/70   Pulse Oximetry SpO2  Min: 94 %  Max: 98 % 98 %   Arterial BP No data recorded     O2 No data recorded       Vital Most Recent Value First Value   Weight 48.5 kg (106 lb 14.8 oz) Weight: 50.3 kg (110 lb 14.3 oz)   Height 4' 10\" (147.3 cm) Height: 4' 10\" (147.3 cm)   BMI   N/A       Examination:    General:  well developed, well nourished and no apparent distress  HEENT: normocephalic, atraumatic, normal conjunctivae and lids, normal lips, teeth, and gums, moist mucus membranes, and no tonsilar exudate  Neck:  trachea midline and normal thyroid  CV: no murmurs, rubs, or thrills and tachycardic  Resp: clear to auscultation bilaterally  Abd: Peritoneal dialysis catheter without erythema or puslike drainage.  Abdomen is soft.  Bowel sounds are positive.  No tenderness to palpation  Ext: No edema  Neuro: CN 2-12 grossly intact  Psych: normal judgement and insight        LABS      Recent Labs   Lab 10/26/24  0742   SODIUM 139   POTASSIUM 5.5*    CHLORIDE 99   CO2 26   BUN 79*   CREATININE 7.48*   GLUCOSE 140*   WBC 9.1   HGB 11.1*   HCT 35.4*        No results found  No results found for this or any previous visit.   No results found for this or any previous visit.  Lab Results   Component Value Date    USPG 1.010 04/24/2024    UPROT 100 (A) 04/24/2024    UWBC Negative 04/24/2024    URBC Negative 04/24/2024    UPH 8.0 (H) 04/24/2024       IMAGING & OTHER STUDIES    CTA ABDOMEN PELVIS    Result Date: 10/26/2024  Narrative: CTA ABDOMEN PELVIS HISTORY: Concern for gastrointestinal bleed. COMPARISON: None. TECHNIQUE:  Multiple axial images were obtained through the abdomen and pelvis without contrast, and following the administration of 100 mL Omnipaque 350 intravenous contrast. Sagittal and coronal reformations were performed by the technologist and submitted to the radiologist for review. FINDINGS:  The visualized lung bases are clear. No focal consolidation. No pleural effusion. The heart is normal in size. No pericardial effusion. ABDOMEN:      Liver:  Unremarkable.      Biliary system: Unremarkable.      Spleen:  Unremarkable.      Pancreas:  Unremarkable.      Adrenal glands:  Unremarkable.      Kidneys: No renal calculi. No hydronephrosis. Symmetric nephrograms. Mildly atrophic kidneys bilaterally.      Bowel: Distal esophagus is unremarkable. The stomach is within normal limits. There is no small bowel obstruction. Colonic diverticulosis without convincing evidence of acute diverticulitis. There is a tiny blush of contrast within the right lower quadrant involving the ascending colon (8/210). This could represent the site of active extravasation. Otherwise, no convincing evidence for active gastrointestinal hemorrhage.      Retroperitoneum/mesentery: There is a small amount of free fluid throughout the abdomen and pelvis. Additionally, there is free air throughout the abdomen and pelvis. These findings could be related to peritoneal dialysis. A  focal perforation of the bowel is not confidently identified.      Vascular: The abdominal aorta is normal in caliber with moderate atherosclerotic disease. The portal veins are patent.      Osseous structures and subcutaneous tissues: Mild multilevel degenerative change of the spine. Mild bilateral hip osteoarthritis. PELVIS:      The urinary bladder is decompressed. Free fluid within the pelvis and peritoneal dialysis catheter noted. The uterus is surgically absent. There are numerous pelvic phleboliths.     Impression: IMPRESSION: 1.  Tiny blush of contrast within the ascending colon could represent the site of active extravasation. 2.  Free fluid and free air throughout the abdomen and pelvis, likely related to peritoneal dialysis. A focal perforation of the bowel is not confidently identified. Correlate with clinical and surgical history. 3.  Colonic diverticulosis without evidence of acute diverticulitis. Electronically Signed by: Larry Lang DO Signed on: 10/26/2024 8:59 AM Created on Workstation ID: [id number] Signed on Workstation ID: [id number]    MRI ANKLE LEFT WO CONTRAST    Result Date: 10/3/2024  Narrative: MRI ANKLE LT WO CONTRAST INDICATION: Anterior left ankle pain.   COMPARISON: Radiographs 07/15/2024. TECHNIQUE: Following localizer, sagittal T1, STIR, coronal and axial proton density and T2 fat-sat sequences were obtained without intravenous contrast.   FINDINGS:   Bones: No reactive marrow changes. Plantar and Achilles calcaneal spurs. Tendons: Prominent localized fusiform enlargement of the tibialis anterior tendon just anterior to the tibiotalar articulation spanning a craniocaudal dimension of 2 cm and measuring 0.8 x 1.3 cm in transverse dimensions, with heterogeneous mixed signal intensity within the tendon at this site. The distal insertion of the tibialis anterior appears intact. Flexor and extensor tendons appear otherwise unremarkable. Distal Achilles tendon: Intact. Mild tendinosis  at the distal insertion. Ligaments: Talofibular ligaments, deltoid ligament complex appear intact. Normal appearance of the syndesmotic ligaments. Ankle joint: Normal. Articular cartilage intact. Subtalar joint: Normal. Plantar fascia: Normal.   Soft tissues: Generalized soft tissue edema.     Impression: 1.  Prominent localized enlargement of the tibialis anterior tendon at the level of the ankle which may reflect chronic partial tearing and tendinosis although suspicious for tenosynovial giant cell tumor. 2.  Soft tissue edema around the ankle. Tirso Dunlap MD Division of Musculoskeletal Radiology [facility] I.2       Assessment/Plan:     Lower GI bleed  Likely diverticular bleed  CTA A/P revealed a tiny blush of contrast within the ascending colon which could represent site of active extravasation  Hemoglobin stable at 11.1  Trend H/H every 8 hours  GI consult to assess for colonoscopy    Chronic diarrhea  Endorses 10-12 watery stools a day  Recently evaluated by GI with multiple labs  Stool calprotectin elevated, IgA elevated  Stool cultures negative but incomplete study secondary to inhibitors present  GI consult to assess for colonoscopy which was scheduled on 11/15    End-stage renal disease on peritoneal dialysis  History of spontaneous bacterial peritonitis, treated  Hyperkalemia, mild  Nephrology consulted for dialysis needs  Patient should be on SBP prophylaxis therapy with ciprofloxacin.  Defer to nephrology   Continue home vitamins    Restless leg syndrome  Continue Requip    NIDDM  Accu-Chek every 6 hours with low-dose sliding scale insulin       Smoking status- non smoker    Nutrition status- appropriate    Body mass index is 22.35 kg/m². - appropriate weight BMI 18.5-24    Code status- Full Resuscitation     DVT Prophylaxis - SCDs    The patients treatment plans were discussed with patient and consultant(s)      Is the patient expected to require a two midnight  stay in the hospital? No I certify that I expect inpatient services for greater than two midnights are medically necessary for this patient. Please see H&P and MD Progress Notes for additional information about the patient's course of treatment.      Shar Camacho DO    10/26/2024    3:03 PM

## 2025-04-17 ENCOUNTER — TRANSCRIBE ORDERS (OUTPATIENT)
Facility: HOSPITAL | Age: 70
End: 2025-04-17

## 2025-04-17 DIAGNOSIS — M25.842 MASS OF JOINT OF LEFT HAND: Primary | ICD-10-CM

## 2025-04-18 ENCOUNTER — HOSPITAL ENCOUNTER (OUTPATIENT)
Facility: HOSPITAL | Age: 70
Discharge: HOME | End: 2025-04-21
Attending: ORTHOPAEDIC SURGERY
Payer: MEDICARE

## 2025-04-18 DIAGNOSIS — M25.842 MASS OF JOINT OF LEFT HAND: ICD-10-CM

## 2025-04-18 PROCEDURE — 73220 MRI UPPR EXTREMITY W/O&W/DYE: CPT

## 2025-04-18 PROCEDURE — A9579 GAD-BASE MR CONTRAST NOS,1ML: HCPCS | Performed by: ORTHOPAEDIC SURGERY

## 2025-04-18 PROCEDURE — 6360000004 HC RX CONTRAST MEDICATION: Performed by: ORTHOPAEDIC SURGERY

## 2025-04-18 RX ADMIN — GADOTERIDOL 13 ML: 279.3 INJECTION, SOLUTION INTRAVENOUS at 16:31

## 2025-04-21 NOTE — PERIOP NOTE
Hello,     You are scheduled to have surgery tomorrow at [facility][note 3].     We would like for you to arrive at  6:45 am  We are located on the second floor, [address]. You will check-in at the registration desk located outside the elevators on the second floor prior to proceeding to [address].  Remember nothing to eat or drink after midnight. If you need to take medications the morning of surgery, please take with a few sips of water.   Wear loose, comfortable clothing and leave all your jewelry at home.   You may bring your cell phone with you.  One family member will be allowed in the pre-op area once you are dressed and your IV has been started.   You will need someone to drive you home and be with you for 24 hours post-anesthesia.     We look forward to seeing you! Call [phone] for questions after hours and [phone] between 5:30AM and 6PM.     Thanks!    [facility] ASU PREOP TEAM    
Spoke with Dr. Snell's surgery scheduler, confirmed NO PAT requested; patient to have H&P with PCP Gavi Dominguez on 4/15.   DOS: 4/22/25  
doctor who prescribed the medications for pre-operative instructions.   Bathing Clothing  Jewelry  Valuables     If you shower the morning of surgery, please do not apply anything to your skin (lotions, powders, deodorant, or makeup, especially mascara).  Wash your body with an antibacterial soap, like Dial for 3 days prior to surgery.   Do not shave or trim anywhere 24 hours before surgery.  Wear your hair loose or down; no pony-tails, buns, or metal hair clips.  Wear loose, comfortable, clean clothes.  Wear glasses instead of contacts.  Bring container for any hearing aides/ dentures (they will need to be removed).  Leave money, valuables, and jewelry, including body piercings, at home.     Going Home - or Spending the Night   SAME-DAY SURGERY: You must have a responsible adult drive you home and stay with you 24 hours after surgery.    ADMITS: If your doctor is keeping you in the hospital after surgery, leave personal belongings/luggage in your car until you have a hospital room number.       Special Instructions   Bring completed Medication Reconciliation Report Sheet with you on day of surgery.      If your physical condition changes (like a fever, cold, flu, etc.) call your surgeon.  If a situation occurs and you are delayed the day of surgery, call [phone] or [phone].               Follow all instructions so your surgery won't be cancelled. Please be on time.   Home medication(s) reviewed and verified verbally with list during PAT phone call.

## 2025-04-22 ENCOUNTER — ANESTHESIA (OUTPATIENT)
Facility: HOSPITAL | Age: 70
End: 2025-04-22
Payer: MEDICARE

## 2025-04-22 ENCOUNTER — HOSPITAL ENCOUNTER (OUTPATIENT)
Facility: HOSPITAL | Age: 70
Setting detail: OUTPATIENT SURGERY
Discharge: HOME | End: 2025-04-22
Attending: ORTHOPAEDIC SURGERY | Admitting: ORTHOPAEDIC SURGERY
Payer: MEDICARE

## 2025-04-22 ENCOUNTER — ANESTHESIA EVENT (OUTPATIENT)
Facility: HOSPITAL | Age: 70
End: 2025-04-22
Payer: MEDICARE

## 2025-04-22 VITALS
DIASTOLIC BLOOD PRESSURE: 61 MMHG | HEIGHT: 62 IN | TEMPERATURE: 97.7 F | RESPIRATION RATE: 15 BRPM | SYSTOLIC BLOOD PRESSURE: 116 MMHG | HEART RATE: 66 BPM | WEIGHT: 134.26 LBS | BODY MASS INDEX: 24.71 KG/M2 | OXYGEN SATURATION: 98 %

## 2025-04-22 PROCEDURE — 88305 TISSUE EXAM BY PATHOLOGIST: CPT

## 2025-04-22 PROCEDURE — 7100000000 HC PACU RECOVERY - FIRST 15 MIN: Performed by: ORTHOPAEDIC SURGERY

## 2025-04-22 PROCEDURE — 6360000002 HC RX W HCPCS: Performed by: NURSE ANESTHETIST, CERTIFIED REGISTERED

## 2025-04-22 PROCEDURE — 3700000000 HC ANESTHESIA ATTENDED CARE: Performed by: ORTHOPAEDIC SURGERY

## 2025-04-22 PROCEDURE — 3600000012 HC SURGERY LEVEL 2 ADDTL 15MIN: Performed by: ORTHOPAEDIC SURGERY

## 2025-04-22 PROCEDURE — 6360000002 HC RX W HCPCS: Performed by: ORTHOPAEDIC SURGERY

## 2025-04-22 PROCEDURE — 2580000003 HC RX 258: Performed by: ANESTHESIOLOGY

## 2025-04-22 PROCEDURE — 64417 NJX AA&/STRD AX NERVE IMG: CPT | Performed by: ANESTHESIOLOGY

## 2025-04-22 PROCEDURE — 2500000003 HC RX 250 WO HCPCS: Performed by: NURSE ANESTHETIST, CERTIFIED REGISTERED

## 2025-04-22 PROCEDURE — 2500000003 HC RX 250 WO HCPCS: Performed by: ORTHOPAEDIC SURGERY

## 2025-04-22 PROCEDURE — 2709999900 HC NON-CHARGEABLE SUPPLY: Performed by: ORTHOPAEDIC SURGERY

## 2025-04-22 PROCEDURE — 6370000000 HC RX 637 (ALT 250 FOR IP): Performed by: ANESTHESIOLOGY

## 2025-04-22 PROCEDURE — 6360000002 HC RX W HCPCS: Performed by: ANESTHESIOLOGY

## 2025-04-22 PROCEDURE — 7100000001 HC PACU RECOVERY - ADDTL 15 MIN: Performed by: ORTHOPAEDIC SURGERY

## 2025-04-22 PROCEDURE — 3700000001 HC ADD 15 MINUTES (ANESTHESIA): Performed by: ORTHOPAEDIC SURGERY

## 2025-04-22 PROCEDURE — 3600000002 HC SURGERY LEVEL 2 BASE: Performed by: ORTHOPAEDIC SURGERY

## 2025-04-22 PROCEDURE — 6370000000 HC RX 637 (ALT 250 FOR IP): Performed by: ORTHOPAEDIC SURGERY

## 2025-04-22 PROCEDURE — 7100000010 HC PHASE II RECOVERY - FIRST 15 MIN: Performed by: ORTHOPAEDIC SURGERY

## 2025-04-22 RX ORDER — EPHEDRINE SULFATE/0.9% NACL/PF 25 MG/5 ML
SYRINGE (ML) INTRAVENOUS
Status: DISCONTINUED | OUTPATIENT
Start: 2025-04-22 | End: 2025-04-22 | Stop reason: SDUPTHER

## 2025-04-22 RX ORDER — HYDROMORPHONE HYDROCHLORIDE 1 MG/ML
1 INJECTION, SOLUTION INTRAMUSCULAR; INTRAVENOUS; SUBCUTANEOUS EVERY 5 MIN PRN
Status: DISCONTINUED | OUTPATIENT
Start: 2025-04-22 | End: 2025-04-22 | Stop reason: HOSPADM

## 2025-04-22 RX ORDER — SODIUM CHLORIDE, SODIUM LACTATE, POTASSIUM CHLORIDE, CALCIUM CHLORIDE 600; 310; 30; 20 MG/100ML; MG/100ML; MG/100ML; MG/100ML
INJECTION, SOLUTION INTRAVENOUS CONTINUOUS
Status: DISCONTINUED | OUTPATIENT
Start: 2025-04-22 | End: 2025-04-22 | Stop reason: HOSPADM

## 2025-04-22 RX ORDER — MEPERIDINE HYDROCHLORIDE 25 MG/ML
12.5 INJECTION INTRAMUSCULAR; INTRAVENOUS; SUBCUTANEOUS EVERY 5 MIN PRN
Status: DISCONTINUED | OUTPATIENT
Start: 2025-04-22 | End: 2025-04-22 | Stop reason: HOSPADM

## 2025-04-22 RX ORDER — NALOXONE HYDROCHLORIDE 0.4 MG/ML
INJECTION, SOLUTION INTRAMUSCULAR; INTRAVENOUS; SUBCUTANEOUS PRN
Status: DISCONTINUED | OUTPATIENT
Start: 2025-04-22 | End: 2025-04-22 | Stop reason: HOSPADM

## 2025-04-22 RX ORDER — DIPHENHYDRAMINE HYDROCHLORIDE 50 MG/ML
12.5 INJECTION, SOLUTION INTRAMUSCULAR; INTRAVENOUS
Status: DISCONTINUED | OUTPATIENT
Start: 2025-04-22 | End: 2025-04-22 | Stop reason: HOSPADM

## 2025-04-22 RX ORDER — MIDAZOLAM HYDROCHLORIDE 1 MG/ML
INJECTION, SOLUTION INTRAMUSCULAR; INTRAVENOUS
Status: DISCONTINUED | OUTPATIENT
Start: 2025-04-22 | End: 2025-04-22 | Stop reason: SDUPTHER

## 2025-04-22 RX ORDER — LIDOCAINE HYDROCHLORIDE 10 MG/ML
1 INJECTION, SOLUTION EPIDURAL; INFILTRATION; INTRACAUDAL; PERINEURAL
Status: DISCONTINUED | OUTPATIENT
Start: 2025-04-22 | End: 2025-04-22 | Stop reason: HOSPADM

## 2025-04-22 RX ORDER — LABETALOL HYDROCHLORIDE 5 MG/ML
10 INJECTION, SOLUTION INTRAVENOUS
Status: DISCONTINUED | OUTPATIENT
Start: 2025-04-22 | End: 2025-04-22 | Stop reason: HOSPADM

## 2025-04-22 RX ORDER — OXYCODONE HYDROCHLORIDE 5 MG/1
5 TABLET ORAL
Status: DISCONTINUED | OUTPATIENT
Start: 2025-04-22 | End: 2025-04-22 | Stop reason: HOSPADM

## 2025-04-22 RX ORDER — ACETAMINOPHEN 325 MG/1
650 TABLET ORAL ONCE
Status: COMPLETED | OUTPATIENT
Start: 2025-04-22 | End: 2025-04-22

## 2025-04-22 RX ORDER — ALBUTEROL SULFATE 0.83 MG/ML
2.5 SOLUTION RESPIRATORY (INHALATION)
Status: DISCONTINUED | OUTPATIENT
Start: 2025-04-22 | End: 2025-04-22 | Stop reason: HOSPADM

## 2025-04-22 RX ORDER — IPRATROPIUM BROMIDE AND ALBUTEROL SULFATE 2.5; .5 MG/3ML; MG/3ML
1 SOLUTION RESPIRATORY (INHALATION)
Status: DISCONTINUED | OUTPATIENT
Start: 2025-04-22 | End: 2025-04-22 | Stop reason: HOSPADM

## 2025-04-22 RX ORDER — PROPOFOL 10 MG/ML
INJECTION, EMULSION INTRAVENOUS
Status: DISCONTINUED | OUTPATIENT
Start: 2025-04-22 | End: 2025-04-22 | Stop reason: SDUPTHER

## 2025-04-22 RX ORDER — DROPERIDOL 2.5 MG/ML
0.62 INJECTION, SOLUTION INTRAMUSCULAR; INTRAVENOUS
Status: DISCONTINUED | OUTPATIENT
Start: 2025-04-22 | End: 2025-04-22 | Stop reason: HOSPADM

## 2025-04-22 RX ORDER — BACITRACIN ZINC 500 [USP'U]/G
OINTMENT TOPICAL PRN
Status: DISCONTINUED | OUTPATIENT
Start: 2025-04-22 | End: 2025-04-22 | Stop reason: HOSPADM

## 2025-04-22 RX ORDER — FENTANYL CITRATE 50 UG/ML
INJECTION, SOLUTION INTRAMUSCULAR; INTRAVENOUS
Status: DISCONTINUED | OUTPATIENT
Start: 2025-04-22 | End: 2025-04-22 | Stop reason: SDUPTHER

## 2025-04-22 RX ADMIN — PROPOFOL 28 MCG/KG/MIN: 10 INJECTION, EMULSION INTRAVENOUS at 08:50

## 2025-04-22 RX ADMIN — MIDAZOLAM HYDROCHLORIDE 2 MG: 1 INJECTION, SOLUTION INTRAMUSCULAR; INTRAVENOUS at 08:50

## 2025-04-22 RX ADMIN — MIDAZOLAM HYDROCHLORIDE 2 MG: 1 INJECTION, SOLUTION INTRAMUSCULAR; INTRAVENOUS at 08:01

## 2025-04-22 RX ADMIN — SODIUM CHLORIDE, SODIUM LACTATE, POTASSIUM CHLORIDE, AND CALCIUM CHLORIDE: .6; .31; .03; .02 INJECTION, SOLUTION INTRAVENOUS at 07:35

## 2025-04-22 RX ADMIN — CEFAZOLIN SODIUM 2000 MG: 1 POWDER, FOR SOLUTION INTRAMUSCULAR; INTRAVENOUS at 08:59

## 2025-04-22 RX ADMIN — EPHEDRINE SULFATE 5 MG: 5 INJECTION INTRAVENOUS at 09:07

## 2025-04-22 RX ADMIN — ACETAMINOPHEN 650 MG: 325 TABLET ORAL at 07:35

## 2025-04-22 RX ADMIN — MEPIVACAINE HYDROCHLORIDE 30 ML: 15 INJECTION, SOLUTION EPIDURAL; INFILTRATION at 08:09

## 2025-04-22 RX ADMIN — FENTANYL CITRATE 100 MCG: 50 INJECTION, SOLUTION INTRAMUSCULAR; INTRAVENOUS at 08:01

## 2025-04-22 ASSESSMENT — PAIN SCALES - GENERAL
PAINLEVEL_OUTOF10: 0
PAINLEVEL_OUTOF10: 0

## 2025-04-22 ASSESSMENT — PAIN - FUNCTIONAL ASSESSMENT: PAIN_FUNCTIONAL_ASSESSMENT: 0-10

## 2025-04-22 NOTE — DISCHARGE INSTRUCTIONS
DISCHARGE SUMMARY from your Nurse    PATIENT INSTRUCTIONS  After general anesthesia or intravenous sedation, for 24 hours or while taking prescription Narcotics:  Limit your activities  Do not drive and operate hazardous machinery  Do not make important personal or business decisions  Do  not drink alcoholic beverages  If you have not urinated within 8 hours after discharge, please contact your surgeon on call.  Report the following to your surgeon:  Excessive pain, swelling, redness or odor of or around the surgical area  Temperature over 100.5  Nausea and vomiting lasting longer than 4 hours or if unable to take medications  Any signs of decreased circulation or nerve impairment to extremity: change in color, persistent  numbness, tingling, coldness or increase pain  Any questions    GOOD HELP TO FIGHT AN INFECTION  Here are a few tip to help reduce the chance of getting an infection after surgery:  Wash Your Hands  Good handwashing is the most important thing you and your caregiver can do.  Wash before and after caring for any wounds.  Dry your hand with a clean towel.  Wash with soap and water for at least 20 seconds. A TIP: sing the \"Happy Birthday\" song through one time while washing to help with the timing.  Use a hand  in between washings.  Shower  When your surgeon says it is OK to take a shower, use a new bar of antibacterial soap (if that is what you use, and keep that bar of soap ONLY for your use), or antibacterial body wash.  Use a clean wash cloth or sponge when you bathe.  Dry off with a clean towel  after every bath - be careful around any wounds, skin staples, sutures or surgical glue over/on wounds.  Do not enter swimming pools, hot tubs, lakes, rivers and/or ocean until wounds are healed and your doctor/surgeon says it is OK.  Use Clean Sheets  Sleep on freshly laundered sheets after your surgery.  Keep the surgery site covered with a clean, dry bandage (if instructed to do so).  If the  [id number] [employer].     Care instructions adapted under license by your healthcare professional. If you have questions about a medical condition or this instruction, always ask your healthcare professional. [employer] disclaims any warranty or liability for your use of this information.    The discharge information has been reviewed with the patient and spouse.    Any questions and concerns from the patient and spouse have been addressed.  The patient and spouse verbalized understanding.      CONTENTS FOUND IN YOUR DISCHARGE ENVELOPE:  [x]     Surgeon and Hospital Discharge Instructions  []     [facility] Care Provider Card  []     Medication & Side Effect Guide (your newly prescribed medications have been marked/highlighted showing the most common side effects from the classes of drugs on your prescriptions)  [x]     Medication Prescription(s) x 3 ( [x] These have been sent electronically to your pharmacy by your surgeon,   - OR -       your surgeon has already provided these to you during a previous/pre-op office visit)  [x]     Follow-up Appointment  []     Surgery-related Pictures/Media  []     Pain block and/or block with On-Q Catheter from Anesthesia Service (information included in your instructions above)  []     School/work excuse note.  []     /parent work excuse note.

## 2025-04-22 NOTE — ANESTHESIA PROCEDURE NOTES
Peripheral Block    Patient location during procedure: pre-op  Reason for block: procedure for pain, post-op pain management, primary anesthetic and at surgeon's request  Start time: 4/22/2025 8:01 AM  End time: 4/22/2025 8:09 AM  Staffing  Performed: anesthesiologist   Anesthesiologist: Nestor Howe MD  Performed by: Nestor Howe MD  Authorized by: Nestor Howe MD    Preanesthetic Checklist  Completed: patient identified, IV checked, site marked, risks and benefits discussed, surgical/procedural consents, pre-op evaluation, timeout performed, anesthesia consent given, oxygen available and monitors applied/VS acknowledged  Peripheral Block   Patient position: supine  Prep: ChloraPrep  Provider prep: mask and sterile gloves  Patient monitoring: cardiac monitor, continuous pulse ox, continuous capnometry, frequent blood pressure checks, IV access, oxygen and responsive to questions  Block type: Axillary  L axillary  Laterality: left  Injection technique: single-shot  Guidance: ultrasound guided    Needle   Needle type: Other   Needle gauge: 22 G  Needle localization: ultrasound guidance  Needle length: 5 cmOther needle type: STIMUPLEX  Assessment   Injection assessment: negative aspiration for heme, no paresthesia on injection, local visualized surrounding nerve on ultrasound and no intravascular symptoms  Hemodynamics: stable  Outcomes: patient tolerated procedure well    Additional Notes  Clare RN witnessed timeout and block written on correct side.

## 2025-04-22 NOTE — BRIEF OP NOTE
Brief Postoperative Note      Patient: Ailin Hodgson  YOB: 1955  MRN: [id number]    Date of Procedure: 4/22/2025    Pre-Op Diagnosis Codes:      * Mass of joint of hand, left [M25.842]    Post-Op Diagnosis: Same       Procedure(s):  EXCISION OF LEFT PALMAR CYST (MAC/WITH SHORT ACTING REGIONAL BLOCK)    Surgeon(s):  John Snell MD    Assistant:  * No surgical staff found *    Anesthesia: Monitor Anesthesia Care    Estimated Blood Loss (mL): Minimal    Complications: None    Specimens:   * No specimens in log *    Implants:  * No implants in log *      Drains: * No LDAs found *    Findings:  Infection Present At Time Of Surgery (PATOS) (choose all levels that have infection present):  No infection present  Other Findings: Left hand cyst  This procedure was not performed to treat primary cutaneous melanoma through wide local excision    Electronically signed by MYA oY on 4/22/2025 at 7:33 AM

## 2025-04-22 NOTE — ANESTHESIA PRE PROCEDURE
input(s): \"POCGLU\", \"POCNA\", \"POCK\", \"POCCL\", \"POCBUN\", \"POCHEMO\", \"POCHCT\" in the last 72 hours.    Coags: No results found for: \"PROTIME\", \"INR\", \"APTT\"    HCG (If Applicable): No results found for: \"PREGTESTUR\", \"PREGSERUM\", \"HCG\", \"HCGQUANT\"     ABGs: No results found for: \"PHART\", \"PO2ART\", \"FPY9LKB\", \"SJG2VJP\", \"BEART\", \"V8FPVZVP\"     Type & Screen (If Applicable):  No results found for: \"ABORH\", \"LABANTI\"    Drug/Infectious Status (If Applicable):  Lab Results   Component Value Date/Time    HEPCAB <0.1 04/24/2017 09:39 AM       COVID-19 Screening (If Applicable): No results found for: \"COVID19\"        Anesthesia Evaluation     no history of anesthetic complications:   Airway: Mallampati: II  TM distance: >3 FB   Neck ROM: full  Mouth opening: > = 3 FB   Dental: normal exam         Pulmonary:Negative Pulmonary ROS breath sounds clear to auscultation                             Cardiovascular:Negative CV ROS            Rhythm: regular  Rate: normal                    Neuro/Psych:   (+) headaches:             ROS comment: Hearting aides GI/Hepatic/Renal: Neg GI/Hepatic/Renal ROS            Endo/Other:    (+) hypothyroidism::..                 Abdominal:             Vascular: negative vascular ROS.         Other Findings:       Anesthesia Plan      MAC and regional     ASA 1       Induction: intravenous.    MIPS: Postoperative opioids intended and Prophylactic antiemetics administered.  Anesthetic plan and risks discussed with patient.      Plan discussed with CRNA.          Post-op pain plan if not by surgeon: regional        Nestor Howe MD   4/22/2025

## 2025-04-23 NOTE — ANESTHESIA POSTPROCEDURE EVALUATION
Department of Anesthesiology  Postprocedure Note    Patient: Ailin Hodgson  MRN: [id number]  YOB: 1955  Date of evaluation: 4/23/2025    Procedure Summary       Date: 04/22/25 Room / Location: [facility] ASU OR  / [facility] AMBULATORY OR    Anesthesia Start: 0845 Anesthesia Stop: 0942    Procedure: EXCISION OF LEFT PALMAR CYST (MAC/WITH SHORT ACTING REGIONAL BLOCK) (Left: Hand) Diagnosis:       Mass of joint of hand, left      (Mass of joint of hand, left [M25.842])    Surgeons: John Snell MD Responsible Provider: Nestor Howe MD    Anesthesia Type: MAC, Regional ASA Status: 1            Anesthesia Type: MAC, Regional    Claudia Phase I: Claudia Score: 10    Claudia Phase II: Claudia Score: 10    Anesthesia Post Evaluation    Patient location during evaluation: PACU  Patient participation: complete - patient participated  Level of consciousness: awake  Airway patency: patent  Nausea & Vomiting: no vomiting and no nausea  Cardiovascular status: hemodynamically stable  Respiratory status: acceptable  Hydration status: stable  Multimodal analgesia pain management approach  Pain management: adequate    No notable events documented.

## (undated) DEVICE — SOLUTION IRRIG 1000ML H2O PIC PLAS SHATTERPROOF CONTAINER

## (undated) DEVICE — SUTURE N ABSRB L 18 IN SZ 4-0 NDL L 19 MM NYL MONOFILAMENT

## (undated) DEVICE — HYPODERMIC SAFETY NEEDLE: Brand: MAGELLAN

## (undated) DEVICE — GLOVE SURG SZ 7 L12IN FNGR THK79MIL GRN LTX FREE

## (undated) DEVICE — BLADE OPHTH 180DEG CUT SURF BLU STR SHRP DBL BVL GRINDLESS

## (undated) DEVICE — ZIMMER® STERILE DISPOSABLE TOURNIQUET CUFF WITH PROTECTIVE SLEEVE AND PLC, DUAL PORT, SINGLE BLADDER, 18 IN. (46 CM)

## (undated) DEVICE — SOLUTION IV 500ML 0.9% SOD BOTTLE CHL LTWT DURABLE SHATTERPROOF

## (undated) DEVICE — DRESSING PETRO W3XL3IN OIL EMUL N ADH GZ KNIT IMPREG CELOS

## (undated) DEVICE — SPONGE GZ W4XL4IN COT 12 PLY TYP VII WVN C FLD DSGN STERILE

## (undated) DEVICE — GLOVE ORANGE PI 7   MSG9070

## (undated) DEVICE — GLOVE SURG SZ 75 L12IN FNGR THK94MIL STD WHT LTX FREE

## (undated) DEVICE — HAND-SFMCASU: Brand: MEDLINE INDUSTRIES, INC.

## (undated) DEVICE — DRESSING GZ FLUF 36X36 IN RND 2 PLY PD GZ AMER WHT CROSS

## (undated) DEVICE — WRAP COHESIVE W2INXL5YD TAN SELF ADH BNDG HND NON STERILE TEAR CARING